# Patient Record
Sex: MALE | Race: WHITE | NOT HISPANIC OR LATINO | URBAN - METROPOLITAN AREA
[De-identification: names, ages, dates, MRNs, and addresses within clinical notes are randomized per-mention and may not be internally consistent; named-entity substitution may affect disease eponyms.]

---

## 2024-11-14 ENCOUNTER — APPOINTMENT (OUTPATIENT)
Dept: CT IMAGING | Facility: HOSPITAL | Age: 44
DRG: 372 | End: 2024-11-14
Payer: OTHER GOVERNMENT

## 2024-11-14 ENCOUNTER — HOSPITAL ENCOUNTER (INPATIENT)
Facility: HOSPITAL | Age: 44
LOS: 3 days | Discharge: HOME | DRG: 372 | End: 2024-11-17
Attending: EMERGENCY MEDICINE
Payer: OTHER GOVERNMENT

## 2024-11-14 DIAGNOSIS — K35.32 APPENDICITIS WITH PERFORATION: Primary | ICD-10-CM

## 2024-11-14 PROBLEM — K37 APPENDICITIS: Status: ACTIVE | Noted: 2024-11-14

## 2024-11-14 LAB
ALBUMIN SERPL-MCNC: 4.1 G/DL (ref 3.5–5.2)
ALBUMIN/GLOB SERPL: 1.1 G/DL
ALP SERPL-CCNC: 83 U/L (ref 39–117)
ALT SERPL W P-5'-P-CCNC: 72 U/L (ref 1–41)
ANION GAP SERPL CALCULATED.3IONS-SCNC: 13.7 MMOL/L (ref 5–15)
ANION GAP SERPL CALCULATED.3IONS-SCNC: 14.7 MMOL/L (ref 5–15)
AST SERPL-CCNC: 45 U/L (ref 1–40)
BACTERIA UR QL AUTO: NORMAL /HPF
BASOPHILS # BLD AUTO: 0.01 10*3/MM3 (ref 0–0.2)
BASOPHILS # BLD AUTO: 0.02 10*3/MM3 (ref 0–0.2)
BASOPHILS NFR BLD AUTO: 0.1 % (ref 0–1.5)
BASOPHILS NFR BLD AUTO: 0.2 % (ref 0–1.5)
BILIRUB SERPL-MCNC: 1.9 MG/DL (ref 0–1.2)
BILIRUB UR QL STRIP: ABNORMAL
BUN SERPL-MCNC: 18 MG/DL (ref 6–20)
BUN SERPL-MCNC: 19 MG/DL (ref 6–20)
BUN/CREAT SERPL: 17 (ref 7–25)
BUN/CREAT SERPL: 20 (ref 7–25)
CALCIUM SPEC-SCNC: 9 MG/DL (ref 8.6–10.5)
CALCIUM SPEC-SCNC: 9 MG/DL (ref 8.6–10.5)
CHLORIDE SERPL-SCNC: 89 MMOL/L (ref 98–107)
CHLORIDE SERPL-SCNC: 90 MMOL/L (ref 98–107)
CLARITY UR: ABNORMAL
CO2 SERPL-SCNC: 22.3 MMOL/L (ref 22–29)
CO2 SERPL-SCNC: 23.3 MMOL/L (ref 22–29)
COLOR UR: ABNORMAL
CREAT SERPL-MCNC: 0.9 MG/DL (ref 0.76–1.27)
CREAT SERPL-MCNC: 1.12 MG/DL (ref 0.76–1.27)
D-LACTATE SERPL-SCNC: 1.2 MMOL/L (ref 0.5–2)
DEPRECATED RDW RBC AUTO: 41.9 FL (ref 37–54)
DEPRECATED RDW RBC AUTO: 42.1 FL (ref 37–54)
EGFRCR SERPLBLD CKD-EPI 2021: 107.3 ML/MIN/1.73
EGFRCR SERPLBLD CKD-EPI 2021: 82.6 ML/MIN/1.73
EOSINOPHIL # BLD AUTO: 0 10*3/MM3 (ref 0–0.4)
EOSINOPHIL # BLD AUTO: 0.01 10*3/MM3 (ref 0–0.4)
EOSINOPHIL NFR BLD AUTO: 0 % (ref 0.3–6.2)
EOSINOPHIL NFR BLD AUTO: 0.1 % (ref 0.3–6.2)
ERYTHROCYTE [DISTWIDTH] IN BLOOD BY AUTOMATED COUNT: 12.1 % (ref 12.3–15.4)
ERYTHROCYTE [DISTWIDTH] IN BLOOD BY AUTOMATED COUNT: 12.1 % (ref 12.3–15.4)
GLOBULIN UR ELPH-MCNC: 3.8 GM/DL
GLUCOSE SERPL-MCNC: 127 MG/DL (ref 65–99)
GLUCOSE SERPL-MCNC: 166 MG/DL (ref 65–99)
GLUCOSE UR STRIP-MCNC: NEGATIVE MG/DL
HCT VFR BLD AUTO: 39.9 % (ref 37.5–51)
HCT VFR BLD AUTO: 43.2 % (ref 37.5–51)
HGB BLD-MCNC: 13.7 G/DL (ref 13–17.7)
HGB BLD-MCNC: 15.1 G/DL (ref 13–17.7)
HGB UR QL STRIP.AUTO: ABNORMAL
HYALINE CASTS UR QL AUTO: NORMAL /LPF
IMM GRANULOCYTES # BLD AUTO: 0.04 10*3/MM3 (ref 0–0.05)
IMM GRANULOCYTES # BLD AUTO: 0.06 10*3/MM3 (ref 0–0.05)
IMM GRANULOCYTES NFR BLD AUTO: 0.4 % (ref 0–0.5)
IMM GRANULOCYTES NFR BLD AUTO: 0.6 % (ref 0–0.5)
INR PPP: 1.21 (ref 0.9–1.1)
KETONES UR QL STRIP: NEGATIVE
LEUKOCYTE ESTERASE UR QL STRIP.AUTO: NEGATIVE
LIPASE SERPL-CCNC: 9 U/L (ref 13–60)
LYMPHOCYTES # BLD AUTO: 0.77 10*3/MM3 (ref 0.7–3.1)
LYMPHOCYTES # BLD AUTO: 0.81 10*3/MM3 (ref 0.7–3.1)
LYMPHOCYTES NFR BLD AUTO: 8.2 % (ref 19.6–45.3)
LYMPHOCYTES NFR BLD AUTO: 8.2 % (ref 19.6–45.3)
MCH RBC QN AUTO: 32.3 PG (ref 26.6–33)
MCH RBC QN AUTO: 32.6 PG (ref 26.6–33)
MCHC RBC AUTO-ENTMCNC: 34.3 G/DL (ref 31.5–35.7)
MCHC RBC AUTO-ENTMCNC: 35 G/DL (ref 31.5–35.7)
MCV RBC AUTO: 93.3 FL (ref 79–97)
MCV RBC AUTO: 94.1 FL (ref 79–97)
MONOCYTES # BLD AUTO: 0.7 10*3/MM3 (ref 0.1–0.9)
MONOCYTES # BLD AUTO: 0.83 10*3/MM3 (ref 0.1–0.9)
MONOCYTES NFR BLD AUTO: 7.5 % (ref 5–12)
MONOCYTES NFR BLD AUTO: 8.4 % (ref 5–12)
NEUTROPHILS NFR BLD AUTO: 7.8 10*3/MM3 (ref 1.7–7)
NEUTROPHILS NFR BLD AUTO: 8.21 10*3/MM3 (ref 1.7–7)
NEUTROPHILS NFR BLD AUTO: 82.8 % (ref 42.7–76)
NEUTROPHILS NFR BLD AUTO: 83.5 % (ref 42.7–76)
NITRITE UR QL STRIP: NEGATIVE
NRBC BLD AUTO-RTO: 0 /100 WBC (ref 0–0.2)
PH UR STRIP.AUTO: 6 [PH] (ref 5–8)
PLATELET # BLD AUTO: 214 10*3/MM3 (ref 140–450)
PLATELET # BLD AUTO: 221 10*3/MM3 (ref 140–450)
PMV BLD AUTO: 10.4 FL (ref 6–12)
PMV BLD AUTO: 10.7 FL (ref 6–12)
POTASSIUM SERPL-SCNC: 3.3 MMOL/L (ref 3.5–5.2)
POTASSIUM SERPL-SCNC: 3.3 MMOL/L (ref 3.5–5.2)
PROT SERPL-MCNC: 7.9 G/DL (ref 6–8.5)
PROT UR QL STRIP: ABNORMAL
PROTHROMBIN TIME: 15.3 SECONDS (ref 11.7–14.2)
RBC # BLD AUTO: 4.24 10*6/MM3 (ref 4.14–5.8)
RBC # BLD AUTO: 4.63 10*6/MM3 (ref 4.14–5.8)
RBC # UR STRIP: NORMAL /HPF
REF LAB TEST METHOD: NORMAL
SODIUM SERPL-SCNC: 125 MMOL/L (ref 136–145)
SODIUM SERPL-SCNC: 128 MMOL/L (ref 136–145)
SP GR UR STRIP: <=1.005 (ref 1–1.03)
SQUAMOUS #/AREA URNS HPF: NORMAL /HPF
UROBILINOGEN UR QL STRIP: ABNORMAL
WBC # UR STRIP: NORMAL /HPF
WBC NRBC COR # BLD AUTO: 9.35 10*3/MM3 (ref 3.4–10.8)
WBC NRBC COR # BLD AUTO: 9.91 10*3/MM3 (ref 3.4–10.8)

## 2024-11-14 PROCEDURE — 87040 BLOOD CULTURE FOR BACTERIA: CPT

## 2024-11-14 PROCEDURE — 25010000002 FENTANYL CITRATE (PF) 50 MCG/ML SOLUTION: Performed by: EMERGENCY MEDICINE

## 2024-11-14 PROCEDURE — 99222 1ST HOSP IP/OBS MODERATE 55: CPT | Performed by: SURGERY

## 2024-11-14 PROCEDURE — 25010000002 ONDANSETRON PER 1 MG: Performed by: EMERGENCY MEDICINE

## 2024-11-14 PROCEDURE — 85610 PROTHROMBIN TIME: CPT | Performed by: SURGERY

## 2024-11-14 PROCEDURE — 85025 COMPLETE CBC W/AUTO DIFF WBC: CPT | Performed by: EMERGENCY MEDICINE

## 2024-11-14 PROCEDURE — 80053 COMPREHEN METABOLIC PANEL: CPT | Performed by: EMERGENCY MEDICINE

## 2024-11-14 PROCEDURE — 83605 ASSAY OF LACTIC ACID: CPT | Performed by: EMERGENCY MEDICINE

## 2024-11-14 PROCEDURE — 25510000001 IOPAMIDOL PER 1 ML: Performed by: EMERGENCY MEDICINE

## 2024-11-14 PROCEDURE — 25010000002 MORPHINE PER 10 MG: Performed by: INTERNAL MEDICINE

## 2024-11-14 PROCEDURE — 25010000002 METRONIDAZOLE 500 MG/100ML SOLUTION

## 2024-11-14 PROCEDURE — 99285 EMERGENCY DEPT VISIT HI MDM: CPT

## 2024-11-14 PROCEDURE — 83690 ASSAY OF LIPASE: CPT | Performed by: EMERGENCY MEDICINE

## 2024-11-14 PROCEDURE — 25010000002 PIPERACILLIN SOD-TAZOBACTAM PER 1 G: Performed by: EMERGENCY MEDICINE

## 2024-11-14 PROCEDURE — 25010000002 KETOROLAC TROMETHAMINE PER 15 MG: Performed by: EMERGENCY MEDICINE

## 2024-11-14 PROCEDURE — 74177 CT ABD & PELVIS W/CONTRAST: CPT

## 2024-11-14 PROCEDURE — 85025 COMPLETE CBC W/AUTO DIFF WBC: CPT

## 2024-11-14 PROCEDURE — 25810000003 LACTATED RINGERS PER 1000 ML: Performed by: EMERGENCY MEDICINE

## 2024-11-14 PROCEDURE — 99285 EMERGENCY DEPT VISIT HI MDM: CPT | Performed by: EMERGENCY MEDICINE

## 2024-11-14 PROCEDURE — 25010000002 CEFTRIAXONE PER 250 MG

## 2024-11-14 PROCEDURE — 81001 URINALYSIS AUTO W/SCOPE: CPT | Performed by: EMERGENCY MEDICINE

## 2024-11-14 PROCEDURE — 25810000003 SODIUM CHLORIDE 0.9 % SOLUTION

## 2024-11-14 RX ORDER — BISACODYL 5 MG/1
5 TABLET, DELAYED RELEASE ORAL DAILY PRN
Status: DISCONTINUED | OUTPATIENT
Start: 2024-11-14 | End: 2024-11-17 | Stop reason: HOSPADM

## 2024-11-14 RX ORDER — ONDANSETRON 4 MG/1
4 TABLET, ORALLY DISINTEGRATING ORAL EVERY 6 HOURS PRN
Status: DISCONTINUED | OUTPATIENT
Start: 2024-11-14 | End: 2024-11-17 | Stop reason: HOSPADM

## 2024-11-14 RX ORDER — METRONIDAZOLE 500 MG/100ML
500 INJECTION, SOLUTION INTRAVENOUS EVERY 8 HOURS
Status: DISCONTINUED | OUTPATIENT
Start: 2024-11-14 | End: 2024-11-17 | Stop reason: HOSPADM

## 2024-11-14 RX ORDER — ONDANSETRON 2 MG/ML
4 INJECTION INTRAMUSCULAR; INTRAVENOUS ONCE
Status: COMPLETED | OUTPATIENT
Start: 2024-11-14 | End: 2024-11-14

## 2024-11-14 RX ORDER — SODIUM CHLORIDE, SODIUM LACTATE, POTASSIUM CHLORIDE, CALCIUM CHLORIDE 600; 310; 30; 20 MG/100ML; MG/100ML; MG/100ML; MG/100ML
1000 INJECTION, SOLUTION INTRAVENOUS CONTINUOUS
Status: DISPENSED | OUTPATIENT
Start: 2024-11-14 | End: 2024-11-14

## 2024-11-14 RX ORDER — IOPAMIDOL 755 MG/ML
100 INJECTION, SOLUTION INTRAVASCULAR
Status: COMPLETED | OUTPATIENT
Start: 2024-11-14 | End: 2024-11-14

## 2024-11-14 RX ORDER — IBUPROFEN 200 MG
600 TABLET ORAL EVERY 6 HOURS PRN
COMMUNITY
End: 2024-11-17 | Stop reason: HOSPADM

## 2024-11-14 RX ORDER — NITROGLYCERIN 0.4 MG/1
0.4 TABLET SUBLINGUAL
Status: DISCONTINUED | OUTPATIENT
Start: 2024-11-14 | End: 2024-11-17 | Stop reason: HOSPADM

## 2024-11-14 RX ORDER — KETOROLAC TROMETHAMINE 30 MG/ML
30 INJECTION, SOLUTION INTRAMUSCULAR; INTRAVENOUS ONCE
Status: COMPLETED | OUTPATIENT
Start: 2024-11-14 | End: 2024-11-14

## 2024-11-14 RX ORDER — SODIUM CHLORIDE 9 MG/ML
100 INJECTION, SOLUTION INTRAVENOUS CONTINUOUS
Status: DISPENSED | OUTPATIENT
Start: 2024-11-14 | End: 2024-11-15

## 2024-11-14 RX ORDER — POTASSIUM CHLORIDE 1500 MG/1
40 TABLET, EXTENDED RELEASE ORAL ONCE
Status: COMPLETED | OUTPATIENT
Start: 2024-11-14 | End: 2024-11-14

## 2024-11-14 RX ORDER — ONDANSETRON 2 MG/ML
4 INJECTION INTRAMUSCULAR; INTRAVENOUS EVERY 6 HOURS PRN
Status: DISCONTINUED | OUTPATIENT
Start: 2024-11-14 | End: 2024-11-17 | Stop reason: HOSPADM

## 2024-11-14 RX ORDER — FENTANYL CITRATE 50 UG/ML
50 INJECTION, SOLUTION INTRAMUSCULAR; INTRAVENOUS ONCE
Status: COMPLETED | OUTPATIENT
Start: 2024-11-14 | End: 2024-11-14

## 2024-11-14 RX ORDER — AMOXICILLIN 250 MG
2 CAPSULE ORAL 2 TIMES DAILY PRN
Status: DISCONTINUED | OUTPATIENT
Start: 2024-11-14 | End: 2024-11-17 | Stop reason: HOSPADM

## 2024-11-14 RX ORDER — BISACODYL 10 MG
10 SUPPOSITORY, RECTAL RECTAL DAILY PRN
Status: DISCONTINUED | OUTPATIENT
Start: 2024-11-14 | End: 2024-11-17 | Stop reason: HOSPADM

## 2024-11-14 RX ORDER — POLYETHYLENE GLYCOL 3350 17 G/17G
17 POWDER, FOR SOLUTION ORAL DAILY PRN
Status: DISCONTINUED | OUTPATIENT
Start: 2024-11-14 | End: 2024-11-17 | Stop reason: HOSPADM

## 2024-11-14 RX ORDER — HYDROCODONE BITARTRATE AND ACETAMINOPHEN 5; 325 MG/1; MG/1
1 TABLET ORAL EVERY 6 HOURS PRN
Status: DISCONTINUED | OUTPATIENT
Start: 2024-11-14 | End: 2024-11-17 | Stop reason: HOSPADM

## 2024-11-14 RX ADMIN — FENTANYL CITRATE 50 MCG: 50 INJECTION, SOLUTION INTRAMUSCULAR; INTRAVENOUS at 09:54

## 2024-11-14 RX ADMIN — METRONIDAZOLE 500 MG: 500 INJECTION, SOLUTION INTRAVENOUS at 23:45

## 2024-11-14 RX ADMIN — HYDROCODONE BITARTRATE AND ACETAMINOPHEN 1 TABLET: 5; 325 TABLET ORAL at 22:41

## 2024-11-14 RX ADMIN — ONDANSETRON 4 MG: 2 INJECTION, SOLUTION INTRAMUSCULAR; INTRAVENOUS at 09:51

## 2024-11-14 RX ADMIN — Medication 5 MG: at 20:53

## 2024-11-14 RX ADMIN — MORPHINE SULFATE 4 MG: 4 INJECTION, SOLUTION INTRAMUSCULAR; INTRAVENOUS at 15:49

## 2024-11-14 RX ADMIN — METRONIDAZOLE 500 MG: 500 INJECTION, SOLUTION INTRAVENOUS at 18:23

## 2024-11-14 RX ADMIN — SODIUM CHLORIDE 100 ML/HR: 9 INJECTION, SOLUTION INTRAVENOUS at 19:48

## 2024-11-14 RX ADMIN — SODIUM CHLORIDE, POTASSIUM CHLORIDE, SODIUM LACTATE AND CALCIUM CHLORIDE 1000 ML/HR: 600; 310; 30; 20 INJECTION, SOLUTION INTRAVENOUS at 11:39

## 2024-11-14 RX ADMIN — POTASSIUM CHLORIDE 40 MEQ: 1500 TABLET, EXTENDED RELEASE ORAL at 18:23

## 2024-11-14 RX ADMIN — KETOROLAC TROMETHAMINE 30 MG: 30 INJECTION, SOLUTION INTRAMUSCULAR at 09:53

## 2024-11-14 RX ADMIN — CEFTRIAXONE 2000 MG: 2 INJECTION, POWDER, FOR SOLUTION INTRAMUSCULAR; INTRAVENOUS at 19:10

## 2024-11-14 RX ADMIN — PIPERACILLIN AND TAZOBACTAM 4.5 G: 4; .5 INJECTION, POWDER, FOR SOLUTION INTRAVENOUS at 11:04

## 2024-11-14 RX ADMIN — IOPAMIDOL 100 ML: 755 INJECTION, SOLUTION INTRAVENOUS at 10:10

## 2024-11-14 NOTE — H&P
"[facility] Medicine Services  History & Physical    Patient Name: Alem Arroyo  : 1980  MRN: [id number]  Primary Care Physician:  Provider, No Known  Date of admission: 2024  Date and Time of Service: 2024 at 1520    Subjective      Chief Complaint: abdominal pain    History of Present Illness: Alem Arroyo is a 44 y.o. male with a CMH of ADHD who presented to [facility] on 2024 as a transfer from [facility] after arriving with c/o abdominal pain since this past weekend. Patient attributed the pain to kidney stones, but once pain persisted until this morning he sought medical treatment. Patient complained of RLQ abdominal pain that worsened with movement. Patient reports he had occasional chills and diaphoresis but never formally checked a temperature at home so fever unknown.  Patient denies SOB, CP, dizziness, headache at this time.  Patient denies any known sick contacts.    On ED evaluation, patient tachycardic on arrival. Labs remarkable for sodium 125, potassium 3.3, chloride 89, elevated ALT/AST at 72/45, total bilirubin 1.9.  Initial lactic 1.2.  CT abdomen pelvis with contrast was performed showing \"Findings compatible with acute appendicitis with appendiceal perforation and subsequent formation of lobular fluid collections in the central right lower pelvis likely all contiguous compatible with abdominal abscess. There are reactive inflammatory   changes involving the sigmoid colon, distal ileum and urinary bladder with some free intraperitoneal gas noted\".  The ED provider spoke with Dr. Martinez with general surgery who agreed to consult on patient.  Case was discussed with Dr. Martinez who desired to do conservative management with IV antibiotics and possible percutaneous drain over surgical intervention at this time.  Patient was started on IV antibiotics and given IV pain medication and antiemetic in the ED. Hospitalist service to admit for " further management.      Review of Systems   Respiratory:  Negative for shortness of breath.    Cardiovascular:  Negative for chest pain.   Gastrointestinal:  Positive for abdominal pain and nausea. Negative for diarrhea.   Neurological:  Negative for dizziness and headaches.       Personal History     No past medical history on file.    No past surgical history on file.    Family History: family history is not on file. Otherwise pertinent FHx was reviewed and not pertinent to current issue.    Social History:      Home Medications:  Prior to Admission Medications       None              Allergies:  No Known Allergies    Objective      Vitals:   Temp:  [98.5 °F (36.9 °C)] 98.5 °F (36.9 °C)  Heart Rate:  [134] 134  Resp:  [16] 16  BP: (143)/(93) 143/93  Body mass index is 33.02 kg/m².  Physical Exam  General: 43 yo male, Alert and oriented, obese, no acute distress.  HENT: Normocephalic, normal hearing, moist oral mucosa, no scleral icterus.  Neck: Supple, non-tender, no carotid bruits, no JVD, no LAD.  Lungs: Clear to auscultation, non-labored respiration.  Heart: RRR, no murmur, gallop or edema.  Abdomen: Soft, tender to palpation in RLQ, non-distended, + bowel sounds.  Musculoskeletal: Normal range of motion and strength, no tenderness or swelling.  Skin: Skin is warm, dry and pink, no rashes or lesions.  Psychiatric: Cooperative, appropriate mood and affect.      Diagnostic Data:  Lab Results (last 24 hours)       Procedure Component Value Units Date/Time    Lactic Acid, Plasma [[id number]]  (Normal) Collected: 11/14/24 1108    Specimen: Blood Updated: 11/14/24 1127     Lactate 1.2 mmol/L     Comprehensive Metabolic Panel [[id number]]  (Abnormal) Collected: 11/14/24 0945    Specimen: Blood Updated: 11/14/24 1009     Glucose 166 mg/dL      BUN 19 mg/dL      Creatinine 1.12 mg/dL      Sodium 125 mmol/L      Potassium 3.3 mmol/L      Chloride 89 mmol/L      CO2 22.3 mmol/L      Calcium 9.0 mg/dL      Total Protein 7.9  g/dL      Albumin 4.1 g/dL      ALT (SGPT) 72 U/L      AST (SGOT) 45 U/L      Alkaline Phosphatase 83 U/L      Total Bilirubin 1.9 mg/dL      Globulin 3.8 gm/dL      A/G Ratio 1.1 g/dL      BUN/Creatinine Ratio 17.0     Anion Gap 13.7 mmol/L      eGFR 82.6 mL/min/1.73     Narrative:      GFR Normal >60  Chronic Kidney Disease <60  Kidney Failure <15      Lipase [[id number]]  (Abnormal) Collected: 11/14/24 0945    Specimen: Blood Updated: 11/14/24 1009     Lipase 9 U/L     CBC & Differential [[id number]]  (Abnormal) Collected: 11/14/24 0945    Specimen: Blood Updated: 11/14/24 0950    Narrative:      The following orders were created for panel order CBC & Differential.  Procedure                               Abnormality         Status                     ---------                               -----------         ------                     CBC Auto Differential[[id number]]        Abnormal            Final result                 Please view results for these tests on the individual orders.    CBC Auto Differential [[id number]]  (Abnormal) Collected: 11/14/24 0945    Specimen: Blood Updated: 11/14/24 0950     WBC 9.91 10*3/mm3      RBC 4.63 10*6/mm3      Hemoglobin 15.1 g/dL      Hematocrit 43.2 %      MCV 93.3 fL      MCH 32.6 pg      MCHC 35.0 g/dL      RDW 12.1 %      RDW-SD 41.9 fl      MPV 10.4 fL      Platelets 214 10*3/mm3      Neutrophil % 82.8 %      Lymphocyte % 8.2 %      Monocyte % 8.4 %      Eosinophil % 0.0 %      Basophil % 0.2 %      Immature Grans % 0.4 %      Neutrophils, Absolute 8.21 10*3/mm3      Lymphocytes, Absolute 0.81 10*3/mm3      Monocytes, Absolute 0.83 10*3/mm3      Eosinophils, Absolute 0.00 10*3/mm3      Basophils, Absolute 0.02 10*3/mm3      Immature Grans, Absolute 0.04 10*3/mm3              Imaging Results (Last 24 Hours)       Procedure Component Value Units Date/Time    CT Abdomen Pelvis With Contrast [[id number]] Collected: 11/14/24 1012     Updated: 11/14/24 1026    Narrative:      CT  ABDOMEN PELVIS W CONTRAST    Date of Exam: 11/14/2024 10:00 AM EST    Indication: rlq pain.    Comparison: None available.    Technique: Axial CT images were obtained of the abdomen and pelvis following the uneventful intravenous administration of iodinated contrast. Sagittal and coronal reconstructions were performed.  Automated exposure control and iterative reconstruction   methods were used.        Findings:  LUNG BASES:  Unremarkable without mass or infiltrate.    LIVER:  Unremarkable parenchyma without focal lesion.  BILIARY/GALLBLADDER:  Unremarkable  SPLEEN:  Unremarkable  PANCREAS:  Unremarkable  ADRENAL:  Unremarkable  KIDNEYS:  Unremarkable parenchyma with no solid mass identified. No obstruction.  No calculus identified.  GASTROINTESTINAL/MESENTERY: Evaluation of the gastrointestinal tract demonstrates multiple fluid collections in the central pelvis and right lower quadrant which are likely all contiguous demonstrating peripheral enhancement and a gas/fluid level. The   fluid collections measure 4.4 x 2.9 cm (image 137 of series 2), 5.3 x 3.8 cm (image 151 of series 2 and 3.6 x 2.2 cm (image 148 of series 2). The appendix has mucosal enhancement and is prominent in size measuring up to 10 mm in size. There is soft   tissue stranding and fluid in the right lower quadrant. Appendicitis with perforation is suspected. There is bowel wall thickening involving the sigmoid colon and distal ileum likely secondary reactive inflammatory changes. Mesenteric stranding and fluid   tracks centrally into the lower pelvis. Focal free intraperitoneal gas is noted in the left lower pelvis (image 142 of series 2).  MESENTERIC VESSELS:  Patent.  AORTA/IVC:  Normal caliber.    RETROPERITONEUM/LYMPH NODES: There is a lymph node anterior to the right psoas muscle along the right iliac vessels measuring 1 cm.    REPRODUCTIVE:  Unremarkable  BLADDER: The urinary bladder demonstrates mild wall thickening and inflammatory fat  stranding likely reactive inflammatory changes as well.    OSSEUS STRUCTURES:  Typical for age with no acute process identified.        Impression:      Impression:    1. Findings compatible with acute appendicitis with appendiceal perforation and subsequent formation of lobular fluid collections in the central right lower pelvis likely all contiguous compatible with abdominal abscess. There are reactive inflammatory   changes involving the sigmoid colon, distal ileum and urinary bladder with some free intraperitoneal gas noted.    2. Findings were called and discussed with Dr. Arroyo by myself at 10:23 a.m.            Electronically Signed: Linda Paul MD    11/14/2024 10:24 AM EST    Workstation ID: [id number]              Assessment & Plan        This is a 44 y.o. male with:    Active and Resolved Problems  There are no hospital problems to display for this patient.      Acute appendicitis with perforation  CT imaging consistent with acute appendicitis with appendiceal perforation  General surgery notified and agreed to consult; Case discussed with Dr. Martinez, plan for conservative management at this time (IV antibiotics and drain placement)  Continue IV antibiotics -changed to Rocephin and Flagyl  Analgesics as needed  Antiemetics as needed  Lactic 1.2  WBC normal at 9.91  A.m. labs ordered  Per Dr. Martinez, IR consulted for drain placement  Clear liquid diet as tolerated today, n.p.o. at midnight    Hyponatremia  Hypokalemia  Replete and recheck  Maintenance IV fluids overnight  A.m. labs ordered to monitor  Slow correction of sodium    ADHD  Patient is not on any medications at home, just recently got off of Wellbutrin    VTE Prophylaxis:  Mechanical VTE prophylaxis orders are signed & held.          The patient desires to be as follows:    CODE STATUS:    Code Status (Patient has no pulse and is not breathing): CPR (Attempt to Resuscitate)  Medical Interventions (Patient has pulse or is breathing):  Full Support        Joy Cruz, who can be contacted at [phone], is the designated person to make medical decisions on the patient's behalf if He is incapable of doing so. This was clarified with patient and/or next of kin on 11/14/2024 during the course of this H&P.    Admission Status:  I believe this patient meets inpatient status.    Expected Length of Stay: >2 midnights    PDMP and Medication Dispenses via Sidebar reviewed and consistent with patient reported medications.    I discussed the patient's findings and my recommendations with patient.      Signature:     This document has been electronically signed by SAIRA Adler on November 14, 2024 13:00 [facility] Hospitalist Team

## 2024-11-14 NOTE — FSED PROVIDER NOTE
Subjective   History of Present Illness  Pt presents with several days of worsening lower abdominal pain that has not improved, he describes dark urine without dysuria, nausea without v/d, drew minimal po, no cp/soa/ha, no cough/uri/f, pain radiates to his groin, no testicle pain, no relieving factors    History provided by:  Patient   used: No        Review of Systems   Respiratory:  Negative for apnea.    Cardiovascular:  Negative for chest pain.   Gastrointestinal:  Positive for abdominal pain.   All other systems reviewed and are negative.      No past medical history on file.    No Known Allergies    No past surgical history on file.    No family history on file.    Social History     Socioeconomic History    Marital status:            Objective   Physical Exam  Vitals and nursing note reviewed.   HENT:      Head: Normocephalic.      Mouth/Throat:      Mouth: Mucous membranes are moist.   Eyes:      Extraocular Movements: Extraocular movements intact.   Cardiovascular:      Rate and Rhythm: Normal rate.   Pulmonary:      Effort: Pulmonary effort is normal.   Abdominal:      Palpations: Abdomen is soft.      Tenderness: There is abdominal tenderness in the right lower quadrant and periumbilical area. There is no guarding.   Skin:     General: Skin is warm.      Capillary Refill: Capillary refill takes less than 2 seconds.   Neurological:      General: No focal deficit present.      Mental Status: He is alert.   Psychiatric:         Mood and Affect: Mood normal.         Procedures           ED Course                                           Medical Decision Making  Ct abd pelvis shows walled off appendiceal perf with abscess, iv zosyn started  Pt given LR, zofran and fentanyl for pain control  Cbc without wbc elevation  Cmp within limits  -lipase  D/w Martinez who request hosp admit, d/w NP who accepts  Pt aware, NPO and feeling ok    Amount and/or Complexity of Data Reviewed  Labs:  ordered. Decision-making details documented in ED Course.  Radiology: ordered. Decision-making details documented in ED Course.    Risk  Prescription drug management.        Final diagnoses:   Appendicitis with perforation       ED Disposition  ED Disposition       ED Disposition   Decision to Admit    Condition   --    Comment   Level of Care: Med/Surg [1]   Admitting Physician: JACKIE ORLANDO [[id number]]   Attending Physician: JACKIE ORLANDO [[id number]]   Patient Class: Observation [104]                 No follow-up provider specified.       Medication List      No changes were made to your prescriptions during this visit.

## 2024-11-14 NOTE — PLAN OF CARE
Goal Outcome Evaluation:   Patient admitted to unit from [facility]. Patient given pain medication, see MAR.

## 2024-11-14 NOTE — CONSULTS
GENERAL SURGERY CONSULTATION NOTE    Consult requested by: [facility]    Patient Care Team:  Provider, No Known as PCP - General    Reason for consult: Acute perforated appendicitis with abscess    Subjective     Patient is a 44 y.o. male presents with onset of abdominal pain which started approximately 5 days ago.  The patient reports that he started having some vague abdominal discomfort which persisted until 2 days ago at which point he could not go to work.  He laid in bed and began to feel better before yesterday at which point he reports that he had significant discomfort in his lower abdomen which prompted him to come in for evaluation.  He has never had any pain like this before.  He nor anyone in his family have any history of inflammatory bowel disease.  He has not had a prior colonoscopy.  In the outside ER, the patient was found to have a normal white blood cell count.  CT scan of the abdomen and pelvis demonstrated acute perforated appendicitis with abscess.  The patient was then subsequently transferred to [facility] for general surgery evaluation and surgical management.     Review of Systems   Constitutional:  Positive for appetite change and fatigue.   Gastrointestinal:  Positive for abdominal distention, abdominal pain, constipation and nausea.        History  History reviewed. No pertinent past medical history.  History reviewed. No pertinent surgical history.  History reviewed. No pertinent family history.  Social History     Tobacco Use    Smoking status: Former     Types: Cigarettes     Start date: 01/2023    Smokeless tobacco: Never   Vaping Use    Vaping status: Never Used   Substance Use Topics    Drug use: Never     Medications Prior to Admission   Medication Sig Dispense Refill Last Dose/Taking    ibuprofen (ADVIL,MOTRIN) 200 MG tablet Take 3 tablets by mouth Every 6 (Six) Hours As Needed for Mild Pain.   11/14/2024 Morning     Allergies:  Patient has no  "known allergies.    Objective     Vital Signs  /88 (BP Location: Left arm, Patient Position: Lying)   Pulse 93   Temp 99.5 °F (37.5 °C) (Oral)   Resp 18   Ht 200.7 cm (79\")   Wt 133 kg (293 lb)   SpO2 97%   BMI 33.01 kg/m²      Physical Exam  Vitals reviewed.   Constitutional:       Appearance: He is well-developed. He is obese.   HENT:      Head: Normocephalic and atraumatic.   Eyes:      Pupils: Pupils are equal, round, and reactive to light.   Cardiovascular:      Rate and Rhythm: Normal rate and regular rhythm.   Pulmonary:      Effort: Pulmonary effort is normal.      Breath sounds: Normal breath sounds.   Abdominal:      General: There is no distension.      Palpations: Abdomen is soft.      Tenderness: There is abdominal tenderness in the right lower quadrant, periumbilical area and suprapubic area. There is guarding. There is no rebound. Positive signs include McBurney's sign. Negative signs include Rovsing's sign.      Hernia: No hernia is present.   Musculoskeletal:         General: Normal range of motion.      Cervical back: Normal range of motion.   Lymphadenopathy:      Cervical: No cervical adenopathy.   Skin:     General: Skin is warm and dry.      Findings: No rash.   Neurological:      Mental Status: He is alert and oriented to person, place, and time.         Results Review:   Lab Results (last 24 hours)       Procedure Component Value Units Date/Time    Urinalysis With Microscopic If Indicated (No Culture) - Urine, Clean Catch [[id number]]  (Abnormal) Collected: 11/14/24 1331    Specimen: Urine, Clean Catch Updated: 11/14/24 1334     Color, UA Dark Yellow     Appearance, UA Cloudy     pH, UA 6.0     Specific Gravity, UA <=1.005     Glucose, UA Negative     Ketones, UA Negative     Bilirubin, UA Small (1+)     Blood, UA Trace     Protein,  mg/dL (2+)     Leuk Esterase, UA Negative     Nitrite, UA Negative     Urobilinogen, UA 0.2 E.U./dL    [place] Urine Culture Tube - Urine, " Clean Catch [[id number]] Collected: 11/14/24 1331    Specimen: Urine, Clean Catch Updated: 11/14/24 1334    Urinalysis, Microscopic Only - Urine, Clean Catch [[id number]] Collected: 11/14/24 1331    Specimen: Urine, Clean Catch Updated: 11/14/24 1334    Lactic Acid, Plasma [[id number]]  (Normal) Collected: 11/14/24 1108    Specimen: Blood Updated: 11/14/24 1127     Lactate 1.2 mmol/L     Comprehensive Metabolic Panel [[id number]]  (Abnormal) Collected: 11/14/24 0945    Specimen: Blood Updated: 11/14/24 1009     Glucose 166 mg/dL      BUN 19 mg/dL      Creatinine 1.12 mg/dL      Sodium 125 mmol/L      Potassium 3.3 mmol/L      Chloride 89 mmol/L      CO2 22.3 mmol/L      Calcium 9.0 mg/dL      Total Protein 7.9 g/dL      Albumin 4.1 g/dL      ALT (SGPT) 72 U/L      AST (SGOT) 45 U/L      Alkaline Phosphatase 83 U/L      Total Bilirubin 1.9 mg/dL      Globulin 3.8 gm/dL      A/G Ratio 1.1 g/dL      BUN/Creatinine Ratio 17.0     Anion Gap 13.7 mmol/L      eGFR 82.6 mL/min/1.73     Narrative:      GFR Normal >60  Chronic Kidney Disease <60  Kidney Failure <15      Lipase [[id number]]  (Abnormal) Collected: 11/14/24 0945    Specimen: Blood Updated: 11/14/24 1009     Lipase 9 U/L     CBC & Differential [[id number]]  (Abnormal) Collected: 11/14/24 0945    Specimen: Blood Updated: 11/14/24 0950    Narrative:      The following orders were created for panel order CBC & Differential.  Procedure                               Abnormality         Status                     ---------                               -----------         ------                     CBC Auto Differential[[id number]]        Abnormal            Final result                 Please view results for these tests on the individual orders.    CBC Auto Differential [[id number]]  (Abnormal) Collected: 11/14/24 0945    Specimen: Blood Updated: 11/14/24 0950     WBC 9.91 10*3/mm3      RBC 4.63 10*6/mm3      Hemoglobin 15.1 g/dL      Hematocrit 43.2 %      MCV 93.3 fL       MCH 32.6 pg      MCHC 35.0 g/dL      RDW 12.1 %      RDW-SD 41.9 fl      MPV 10.4 fL      Platelets 214 10*3/mm3      Neutrophil % 82.8 %      Lymphocyte % 8.2 %      Monocyte % 8.4 %      Eosinophil % 0.0 %      Basophil % 0.2 %      Immature Grans % 0.4 %      Neutrophils, Absolute 8.21 10*3/mm3      Lymphocytes, Absolute 0.81 10*3/mm3      Monocytes, Absolute 0.83 10*3/mm3      Eosinophils, Absolute 0.00 10*3/mm3      Basophils, Absolute 0.02 10*3/mm3      Immature Grans, Absolute 0.04 10*3/mm3           CT Abdomen Pelvis With Contrast    Result Date: 11/14/2024  Impression: 1. Findings compatible with acute appendicitis with appendiceal perforation and subsequent formation of lobular fluid collections in the central right lower pelvis likely all contiguous compatible with abdominal abscess. There are reactive inflammatory changes involving the sigmoid colon, distal ileum and urinary bladder with some free intraperitoneal gas noted. 2. Findings were called and discussed with Dr. Arroyo by myself at 10:23 a.m. Electronically Signed: Linda Paul MD  11/14/2024 10:24 AM EST  Workstation ID: [id number]       I reviewed the patient's new imaging results and agree with the interpretation.  I reviewed the patient's other test results and agree with the interpretation    Assessment & Plan     Principal Problem:    Appendicitis    Laboratory values and CT scan images reviewed.  Patient appears to have acute appendicitis with appendiceal perforation and abscess.  Recommend trial of nonoperative management with IR drain placement and IV antibiotics.  Discussed with patient goal would be to treat this initially with nonoperative management and then follow-up with interval appendectomy in about 2 months.  Risk, benefits, and alternatives to this were discussed with the patient understands, and agrees to continue nonoperative management.  Discussed with IR will plan for this tomorrow  Okay for clear liquid diet tonight,  n.p.o. after midnight  Discussed with APRN from hospitalist service antibiotic selection which would be cefepime and Flagyl.    I discussed the patients findings and my recommendations with the patient.     Singh Martinez MD  11/14/24  16:01 EST

## 2024-11-14 NOTE — ED NOTES
Pt presents with c/o RLQ abdominal pain, reports radiating to middle of the abdomen x 5 days. Pt reports increased difficulty urinating, states last urinated this morning. Reports concern for kidney stones. Reports recently stopped taking prescribed wellbutrin.

## 2024-11-14 NOTE — ED NOTES
Report given to [facility]. Pt transferred to [facility]. Bed status: Ready.

## 2024-11-14 NOTE — Clinical Note
Level of Care: Med/Surg [1]   Diagnosis: Appendicitis [957616]   Admitting Physician: JACKIE ORLANDO [[id number]]   Attending Physician: JACKIE ORLANDO [[id number]]   Certification: I Certify That Inpatient Hospital Services Are Medically Necessary For Greater Than 2 Midnights

## 2024-11-15 ENCOUNTER — APPOINTMENT (OUTPATIENT)
Dept: CT IMAGING | Facility: HOSPITAL | Age: 44
DRG: 372 | End: 2024-11-15
Payer: OTHER GOVERNMENT

## 2024-11-15 PROBLEM — K35.32 APPENDICITIS WITH PERFORATION: Status: ACTIVE | Noted: 2024-11-15

## 2024-11-15 LAB — POTASSIUM SERPL-SCNC: 4 MMOL/L (ref 3.5–5.2)

## 2024-11-15 PROCEDURE — 25010000002 MIDAZOLAM PER 1 MG: Performed by: RADIOLOGY

## 2024-11-15 PROCEDURE — 25010000002 CEFTRIAXONE PER 250 MG

## 2024-11-15 PROCEDURE — 87070 CULTURE OTHR SPECIMN AEROBIC: CPT | Performed by: RADIOLOGY

## 2024-11-15 PROCEDURE — 25010000002 LIDOCAINE 1 % SOLUTION: Performed by: RADIOLOGY

## 2024-11-15 PROCEDURE — 25810000003 SODIUM CHLORIDE 0.9 % SOLUTION

## 2024-11-15 PROCEDURE — C1769 GUIDE WIRE: HCPCS

## 2024-11-15 PROCEDURE — 0W9G30Z DRAINAGE OF PERITONEAL CAVITY WITH DRAINAGE DEVICE, PERCUTANEOUS APPROACH: ICD-10-PCS | Performed by: RADIOLOGY

## 2024-11-15 PROCEDURE — 49406 IMAGE CATH FLUID PERI/RETRO: CPT

## 2024-11-15 PROCEDURE — 87205 SMEAR GRAM STAIN: CPT | Performed by: RADIOLOGY

## 2024-11-15 PROCEDURE — 99152 MOD SED SAME PHYS/QHP 5/>YRS: CPT

## 2024-11-15 PROCEDURE — C1729 CATH, DRAINAGE: HCPCS

## 2024-11-15 PROCEDURE — 84132 ASSAY OF SERUM POTASSIUM: CPT | Performed by: INTERNAL MEDICINE

## 2024-11-15 PROCEDURE — 25010000002 METRONIDAZOLE 500 MG/100ML SOLUTION

## 2024-11-15 PROCEDURE — 87186 SC STD MICRODIL/AGAR DIL: CPT | Performed by: RADIOLOGY

## 2024-11-15 PROCEDURE — 25010000002 FENTANYL CITRATE (PF) 50 MCG/ML SOLUTION: Performed by: RADIOLOGY

## 2024-11-15 PROCEDURE — 87015 SPECIMEN INFECT AGNT CONCNTJ: CPT | Performed by: RADIOLOGY

## 2024-11-15 RX ORDER — LIDOCAINE HYDROCHLORIDE 10 MG/ML
INJECTION, SOLUTION INFILTRATION; PERINEURAL AS NEEDED
Status: COMPLETED | OUTPATIENT
Start: 2024-11-15 | End: 2024-11-15

## 2024-11-15 RX ORDER — MIDAZOLAM HYDROCHLORIDE 1 MG/ML
INJECTION, SOLUTION INTRAMUSCULAR; INTRAVENOUS AS NEEDED
Status: COMPLETED | OUTPATIENT
Start: 2024-11-15 | End: 2024-11-15

## 2024-11-15 RX ORDER — FENTANYL CITRATE 50 UG/ML
INJECTION, SOLUTION INTRAMUSCULAR; INTRAVENOUS AS NEEDED
Status: COMPLETED | OUTPATIENT
Start: 2024-11-15 | End: 2024-11-15

## 2024-11-15 RX ORDER — POTASSIUM CHLORIDE 1500 MG/1
40 TABLET, EXTENDED RELEASE ORAL EVERY 4 HOURS
Status: COMPLETED | OUTPATIENT
Start: 2024-11-15 | End: 2024-11-15

## 2024-11-15 RX ADMIN — FENTANYL CITRATE 100 MCG: 50 INJECTION, SOLUTION INTRAMUSCULAR; INTRAVENOUS at 08:34

## 2024-11-15 RX ADMIN — MIDAZOLAM 1 MG: 1 INJECTION INTRAMUSCULAR; INTRAVENOUS at 08:38

## 2024-11-15 RX ADMIN — CEFTRIAXONE 2000 MG: 2 INJECTION, POWDER, FOR SOLUTION INTRAMUSCULAR; INTRAVENOUS at 16:36

## 2024-11-15 RX ADMIN — HYDROCODONE BITARTRATE AND ACETAMINOPHEN 1 TABLET: 5; 325 TABLET ORAL at 05:44

## 2024-11-15 RX ADMIN — SODIUM CHLORIDE 100 ML/HR: 9 INJECTION, SOLUTION INTRAVENOUS at 06:32

## 2024-11-15 RX ADMIN — METRONIDAZOLE 500 MG: 500 INJECTION, SOLUTION INTRAVENOUS at 07:50

## 2024-11-15 RX ADMIN — MIDAZOLAM 1 MG: 1 INJECTION INTRAMUSCULAR; INTRAVENOUS at 08:34

## 2024-11-15 RX ADMIN — METRONIDAZOLE 500 MG: 500 INJECTION, SOLUTION INTRAVENOUS at 15:44

## 2024-11-15 RX ADMIN — SODIUM CHLORIDE 100 ML/HR: 9 INJECTION, SOLUTION INTRAVENOUS at 19:11

## 2024-11-15 RX ADMIN — Medication 5 MG: at 20:15

## 2024-11-15 RX ADMIN — POTASSIUM CHLORIDE 40 MEQ: 1500 TABLET, EXTENDED RELEASE ORAL at 05:41

## 2024-11-15 RX ADMIN — POTASSIUM CHLORIDE 40 MEQ: 1500 TABLET, EXTENDED RELEASE ORAL at 02:01

## 2024-11-15 RX ADMIN — LIDOCAINE HYDROCHLORIDE 10 ML: 10 INJECTION, SOLUTION INFILTRATION; PERINEURAL at 08:42

## 2024-11-15 RX ADMIN — FENTANYL CITRATE 50 MCG: 50 INJECTION, SOLUTION INTRAMUSCULAR; INTRAVENOUS at 08:50

## 2024-11-15 NOTE — H&P
[facility]   Interventional Radiology H&P    Patient Name: Alem Arroyo  : 1980  MRN: [id number]  Primary Care Physician:  Provider, No Known  Referring Physician: No Known Provider  Date of admission: 2024    Subjective   Subjective     HPI:  Alem Arroyo is a 44 y.o. male with perforated appendicitis.    Review of Systems:   Constitutional no fever,  no weight loss       Otolaryngeal no difficulty swallowing   Cardiovascular no chest pain   Pulmonary no cough, no sputum production   Gastrointestinal no constipation, no diarrhea                         Personal History       Past Medical/Surgical History: History reviewed. No pertinent past medical history.  History reviewed. No pertinent surgical history.    Social History:  reports that he has quit smoking. His smoking use included cigarettes. He started smoking about 22 months ago. He has never used smokeless tobacco. He reports that he does not use drugs.    Medications:  Medications Prior to Admission   Medication Sig Dispense Refill Last Dose/Taking    ibuprofen (ADVIL,MOTRIN) 200 MG tablet Take 3 tablets by mouth Every 6 (Six) Hours As Needed for Mild Pain.   2024 Morning     Current medications:  cefTRIAXone, 2,000 mg, Intravenous, Q24H   And  metroNIDAZOLE, 500 mg, Intravenous, Q8H      Current IV drips:  sodium chloride, 100 mL/hr, Last Rate: 100 mL/hr (11/15/24 0632)        Allergies:  No Known Allergies    Objective    Objective     Vitals:   Temp:  [98 °F (36.7 °C)-99.6 °F (37.6 °C)] 98.6 °F (37 °C)  Heart Rate:  [] 87  Resp:  [16-25] 22  BP: (118-178)/(71-94) 129/94      Physical Exam:   Constitutional: Awake, alert, No acute distress    Respiratory: Clear to auscultation bilaterally, nonlabored respirations    Cardiovascular: RRR, no murmurs, rubs, or gallops, palpable pedal pulses bilaterally   Gastrointestinal: Positive bowel sounds, soft, nontender, nondistended        ASA SCALE ASSESSMENT:  2-Mild to moderate  "systemic disease, medically well controlled, with no functional limitation    MALLAMPATI CLASSIFICATION:  2-Able to visualize the soft palate, fauces, uvula. The anterior & posterior tonsilar pillars are hidden by the tongue.       Result Review        Result Review:     Sodium   Date Value Ref Range Status   11/14/2024 128 (L) 136 - 145 mmol/L Final   11/14/2024 125 (L) 136 - 145 mmol/L Final       Potassium   Date Value Ref Range Status   11/14/2024 3.3 (L) 3.5 - 5.2 mmol/L Final   11/14/2024 3.3 (L) 3.5 - 5.2 mmol/L Final       Chloride   Date Value Ref Range Status   11/14/2024 90 (L) 98 - 107 mmol/L Final   11/14/2024 89 (L) 98 - 107 mmol/L Final       No results found for: \"PLASMABICARB\"    BUN   Date Value Ref Range Status   11/14/2024 18 6 - 20 mg/dL Final   11/14/2024 19 6 - 20 mg/dL Final       Creatinine   Date Value Ref Range Status   11/14/2024 0.90 0.76 - 1.27 mg/dL Final   11/14/2024 1.12 0.76 - 1.27 mg/dL Final       Calcium   Date Value Ref Range Status   11/14/2024 9.0 8.6 - 10.5 mg/dL Final   11/14/2024 9.0 8.6 - 10.5 mg/dL Final           No components found for: \"GLUCOSE.*\"  Results from last 7 days   Lab Units 11/14/24  2228   WBC 10*3/mm3 9.35   HEMOGLOBIN g/dL 13.7   HEMATOCRIT % 39.9   PLATELETS 10*3/mm3 221      Results from last 7 days   Lab Units 11/14/24  1759   INR  1.21*           Assessment / Plan     Assesment:   Perforated appendicitis.      Plan:   Pelvic drain placement    The risks and benefits of the procedure were discussed with the patient.    Electronically signed by Lyndon Andres MD, 11/15/24, 8:36 AM EST.   "

## 2024-11-15 NOTE — PLAN OF CARE
Goal Outcome Evaluation:  Plan of Care Reviewed With: patient        Progress: improving  Outcome Evaluation: Patient has been resting well throughout the shift. Remains on NS running at 100mL/hr and IV antibiotics. Had JAGUAR drain placed with IR this morning. Per patient, it has relieved pain and bloating. Back on regular diet. Potassium replaced and back up to 4.0. No other issues at this time.

## 2024-11-15 NOTE — PROGRESS NOTES
[facility] MEDICINE SERVICE  DAILY PROGRESS NOTE    NAME: Alem Arroyo  : 1980  MRN: [id number]      LOS: 1 day     PROVIDER OF SERVICE: Lino Guidry MD    Chief Complaint: Appendicitis    Subjective:     Interval History:  History taken from: patient    Patient seen and examined at bedside this morning.  No acute complaints overnight.  States that his pain has improved greatly since he had the IR drain placed.  Also received fentanyl so it could be that        Review of Systems: Negative except described above  Review of Systems    Objective:     Vital Signs  Temp:  [98 °F (36.7 °C)-99.6 °F (37.6 °C)] 98.3 °F (36.8 °C)  Heart Rate:  [] 91  Resp:  [15-25] 23  BP: (118-178)/(71-94) 134/86   Body mass index is 33.01 kg/m².    Physical Exam  Physical Exam  Constitutional:       Comments: NAD    Cardiovascular:      Comments:  RRR, S1 & S2   Pulmonary:      Comments:  Lungs CTA   Abdominal:      Comments:  ABD soft, NT.  JAGUAR drain            Diagnostic Data    Results from last 7 days   Lab Units 11/15/24  1012 24  2228 24  0945   WBC 10*3/mm3  --  9.35 9.91   HEMOGLOBIN g/dL  --  13.7 15.1   HEMATOCRIT %  --  39.9 43.2   PLATELETS 10*3/mm3  --  221 214   GLUCOSE mg/dL  --  127* 166*   CREATININE mg/dL  --  0.90 1.12   BUN mg/dL  --  18 19   SODIUM mmol/L  --  128* 125*   POTASSIUM mmol/L 4.0 3.3* 3.3*   AST (SGOT) U/L  --   --  45*   ALT (SGPT) U/L  --   --  72*   ALK PHOS U/L  --   --  83   BILIRUBIN mg/dL  --   --  1.9*   ANION GAP mmol/L  --  14.7 13.7       CT Guided Percutaneous Drain Peritoneal    Result Date: 11/15/2024  1. Successful CT-guided drain of abscess in the low pelvis Electronically Signed: Lyndon Andres MD  11/15/2024 9:03 AM EST  Workstation ID: [id number]    CT Abdomen Pelvis With Contrast    Result Date: 2024  Impression: 1. Findings compatible with acute appendicitis with appendiceal perforation and subsequent formation of lobular fluid  collections in the central right lower pelvis likely all contiguous compatible with abdominal abscess. There are reactive inflammatory changes involving the sigmoid colon, distal ileum and urinary bladder with some free intraperitoneal gas noted. 2. Findings were called and discussed with Dr. Arroyo by myself at 10:23 a.m. Electronically Signed: Linda Paul MD  11/14/2024 10:24 AM EST  Workstation ID: [id number]       I reviewed the patient's new clinical results.    Assessment/Plan:     Active and Resolved Problems  Active Hospital Problems    Diagnosis  POA    **Appendicitis [K37]  Yes      Resolved Hospital Problems   No resolved problems to display.       Acute appendicitis with perforation  CT imaging consistent with acute appendicitis with appendiceal perforation  General surgery following, recommend trial of nonop management with drain placement and IV antibiotics first  Patient underwent drain placement with IR on 11/15  Blood cultures and body fluid cultures ordered  Continue IV antibiotics -changed to Rocephin and Flagyl  Analgesics as needed  Antiemetics as needed       Hyponatremia  Hypokalemia  Replete and recheck  Maintenance IV fluids overnight  A.m. labs ordered to monitor  Slow correction of sodium     ADHD  Patient is not on any medications at home, just recently got off of Wellbutrin       VTE Prophylaxis:  Mechanical VTE prophylaxis orders are present.            Time: 35 minutes     Code Status and Medical Interventions: CPR (Attempt to Resuscitate); Full Support   Ordered at: 11/14/24 1127     Code Status (Patient has no pulse and is not breathing):    CPR (Attempt to Resuscitate)     Medical Interventions (Patient has pulse or is breathing):    Full Support       Signature: Electronically signed by Lino Guidry MD, 11/15/24, 11:44 EST.  [facility] Hospitalist Team

## 2024-11-15 NOTE — PLAN OF CARE
Goal Outcome Evaluation:         A/Ox4. Up Adlib. IV Abx. RLQ pain. See MAR. NPO at midnight. O2 stats dropped into upper 80's. Per patient no hx of apnea. Put on 2LNC. Slept between care.

## 2024-11-15 NOTE — CASE MANAGEMENT/SOCIAL WORK
Discharge Planning Assessment   Jaquan     Patient Name: Alem Arroyo  MRN: [id number]  Today's Date: 11/15/2024    Admit Date: 11/14/2024    Plan: Anticipate routine home.   Discharge Needs Assessment       Row Name 11/15/24 1051       Living Environment    People in Home spouse;child(matt), dependent    Name(s) of People in Home Wife-Rhe    Current Living Arrangements home    Potentially Unsafe Housing Conditions none    In the past 12 months has the electric, gas, oil, or water company threatened to shut off services in your home? No    Primary Care Provided by self    Provides Primary Care For child(matt)    Family Caregiver if Needed spouse    Family Caregiver Names Rhessa    Quality of Family Relationships helpful;involved;supportive    Able to Return to Prior Arrangements yes       Resource/Environmental Concerns    Resource/Environmental Concerns none    Transportation Concerns none       Transportation Needs    In the past 12 months, has lack of transportation kept you from medical appointments or from getting medications? no    In the past 12 months, has lack of transportation kept you from meetings, work, or from getting things needed for daily living? No       Food Insecurity    Within the past 12 months, you worried that your food would run out before you got the money to buy more. Never true    Within the past 12 months, the food you bought just didn't last and you didn't have money to get more. Never true       Transition Planning    Patient/Family Anticipates Transition to home;home with family    Patient/Family Anticipated Services at Transition none    Transportation Anticipated car, drives self;family or friend will provide       Discharge Needs Assessment    Readmission Within the Last 30 Days no previous admission in last 30 days    Equipment Currently Used at Home none    Concerns to be Addressed denies needs/concerns at this time;no discharge needs identified    Do you want help finding or  "keeping work or a job? Patient declined    Do you want help with school or training? For example, starting or completing job training or getting a high school diploma, GED or equivalent No    Anticipated Changes Related to Illness none    Equipment Needed After Discharge none    Provided Post Acute Provider List? N/A    Provided Post Acute Provider Quality & Resource List? N/A                   Discharge Plan       Row Name 11/15/24 1053       Plan    Plan Anticipate routine home.    Patient/Family in Agreement with Plan yes    Plan Comments CM met with patient at bedside. Pt lives at home with wife Joy and 11 year old son. Pt drives and is independent with ADL's. [facility]. Pharmacy is [facility]. No DME used at home. Wife to provide transportation. IR placed pelvic drain today, continues on IV abx. CM contacted [facility] , spoke to Emmy, and notified of hospital admission. [facility], \"refusal to transfer\" form not needed.              Demographic Summary       Row Name 11/15/24 1047       General Information    Admission Type inpatient    Arrived From emergency department    Referral Source admission list    Reason for Consult care coordination/care conference;discharge planning    Preferred Language English       Contact Information    Permission Granted to Share Info With                    Functional Status       Row Name 11/15/24 1050       Functional Status    Usual Activity Tolerance excellent    Current Activity Tolerance excellent       Functional Status, IADL    Medications independent    Meal Preparation independent    Housekeeping independent    Laundry independent    Shopping independent    If for any reason you need help with day-to-day activities such as bathing, preparing meals, shopping, managing finances, etc., do you get the help you need? I don't need any help      "        Megan Naegele, RN     Office Phone: [phone]  Office Cell: [phone]

## 2024-11-16 LAB
ANION GAP SERPL CALCULATED.3IONS-SCNC: 10.7 MMOL/L (ref 5–15)
BASOPHILS # BLD AUTO: 0.04 10*3/MM3 (ref 0–0.2)
BASOPHILS NFR BLD AUTO: 0.5 % (ref 0–1.5)
BUN SERPL-MCNC: 11 MG/DL (ref 6–20)
BUN/CREAT SERPL: 15.3 (ref 7–25)
CALCIUM SPEC-SCNC: 8.2 MG/DL (ref 8.6–10.5)
CHLORIDE SERPL-SCNC: 95 MMOL/L (ref 98–107)
CO2 SERPL-SCNC: 25.3 MMOL/L (ref 22–29)
CREAT SERPL-MCNC: 0.72 MG/DL (ref 0.76–1.27)
DEPRECATED RDW RBC AUTO: 43.2 FL (ref 37–54)
EGFRCR SERPLBLD CKD-EPI 2021: 114.8 ML/MIN/1.73
EOSINOPHIL # BLD AUTO: 0.09 10*3/MM3 (ref 0–0.4)
EOSINOPHIL NFR BLD AUTO: 1.1 % (ref 0.3–6.2)
ERYTHROCYTE [DISTWIDTH] IN BLOOD BY AUTOMATED COUNT: 12.1 % (ref 12.3–15.4)
GLUCOSE SERPL-MCNC: 115 MG/DL (ref 65–99)
HCT VFR BLD AUTO: 33.6 % (ref 37.5–51)
HGB BLD-MCNC: 11.5 G/DL (ref 13–17.7)
IMM GRANULOCYTES # BLD AUTO: 0.07 10*3/MM3 (ref 0–0.05)
IMM GRANULOCYTES NFR BLD AUTO: 0.9 % (ref 0–0.5)
LYMPHOCYTES # BLD AUTO: 0.82 10*3/MM3 (ref 0.7–3.1)
LYMPHOCYTES NFR BLD AUTO: 10.4 % (ref 19.6–45.3)
MCH RBC QN AUTO: 33.1 PG (ref 26.6–33)
MCHC RBC AUTO-ENTMCNC: 34.2 G/DL (ref 31.5–35.7)
MCV RBC AUTO: 96.8 FL (ref 79–97)
MONOCYTES # BLD AUTO: 0.86 10*3/MM3 (ref 0.1–0.9)
MONOCYTES NFR BLD AUTO: 10.9 % (ref 5–12)
NEUTROPHILS NFR BLD AUTO: 5.99 10*3/MM3 (ref 1.7–7)
NEUTROPHILS NFR BLD AUTO: 76.2 % (ref 42.7–76)
NRBC BLD AUTO-RTO: 0 /100 WBC (ref 0–0.2)
PLATELET # BLD AUTO: 189 10*3/MM3 (ref 140–450)
PMV BLD AUTO: 10.8 FL (ref 6–12)
POTASSIUM SERPL-SCNC: 3.2 MMOL/L (ref 3.5–5.2)
POTASSIUM SERPL-SCNC: 3.6 MMOL/L (ref 3.5–5.2)
QT INTERVAL: 359 MS
QTC INTERVAL: 417 MS
RBC # BLD AUTO: 3.47 10*6/MM3 (ref 4.14–5.8)
SODIUM SERPL-SCNC: 131 MMOL/L (ref 136–145)
WBC NRBC COR # BLD AUTO: 7.87 10*3/MM3 (ref 3.4–10.8)

## 2024-11-16 PROCEDURE — 93005 ELECTROCARDIOGRAM TRACING: CPT

## 2024-11-16 PROCEDURE — 85025 COMPLETE CBC W/AUTO DIFF WBC: CPT

## 2024-11-16 PROCEDURE — 99232 SBSQ HOSP IP/OBS MODERATE 35: CPT

## 2024-11-16 PROCEDURE — 25010000002 METRONIDAZOLE 500 MG/100ML SOLUTION

## 2024-11-16 PROCEDURE — 80048 BASIC METABOLIC PNL TOTAL CA: CPT

## 2024-11-16 PROCEDURE — 25010000002 CEFTRIAXONE PER 250 MG

## 2024-11-16 PROCEDURE — 93010 ELECTROCARDIOGRAM REPORT: CPT | Performed by: INTERNAL MEDICINE

## 2024-11-16 PROCEDURE — 84132 ASSAY OF SERUM POTASSIUM: CPT

## 2024-11-16 RX ORDER — METRONIDAZOLE 500 MG/1
500 TABLET ORAL 3 TIMES DAILY
Qty: 42 TABLET | Refills: 0 | Status: SHIPPED | OUTPATIENT
Start: 2024-11-16 | End: 2024-11-30

## 2024-11-16 RX ORDER — POTASSIUM CHLORIDE 1500 MG/1
40 TABLET, EXTENDED RELEASE ORAL EVERY 4 HOURS
Status: COMPLETED | OUTPATIENT
Start: 2024-11-16 | End: 2024-11-16

## 2024-11-16 RX ORDER — GUAIFENESIN 200 MG/10ML
200 LIQUID ORAL EVERY 4 HOURS PRN
Status: DISCONTINUED | OUTPATIENT
Start: 2024-11-16 | End: 2024-11-17 | Stop reason: HOSPADM

## 2024-11-16 RX ORDER — BENZONATATE 100 MG/1
100 CAPSULE ORAL 3 TIMES DAILY PRN
Status: DISCONTINUED | OUTPATIENT
Start: 2024-11-16 | End: 2024-11-17 | Stop reason: HOSPADM

## 2024-11-16 RX ADMIN — CEFTRIAXONE 2000 MG: 2 INJECTION, POWDER, FOR SOLUTION INTRAMUSCULAR; INTRAVENOUS at 17:02

## 2024-11-16 RX ADMIN — METRONIDAZOLE 500 MG: 500 INJECTION, SOLUTION INTRAVENOUS at 17:02

## 2024-11-16 RX ADMIN — HYDROCODONE BITARTRATE AND ACETAMINOPHEN 1 TABLET: 5; 325 TABLET ORAL at 19:36

## 2024-11-16 RX ADMIN — GUAIFENESIN 200 MG: 200 SOLUTION ORAL at 02:02

## 2024-11-16 RX ADMIN — METRONIDAZOLE 500 MG: 500 INJECTION, SOLUTION INTRAVENOUS at 00:39

## 2024-11-16 RX ADMIN — POTASSIUM CHLORIDE 40 MEQ: 1500 TABLET, EXTENDED RELEASE ORAL at 03:26

## 2024-11-16 RX ADMIN — POTASSIUM CHLORIDE 40 MEQ: 1500 TABLET, EXTENDED RELEASE ORAL at 09:06

## 2024-11-16 RX ADMIN — POTASSIUM CHLORIDE 40 MEQ: 1500 TABLET, EXTENDED RELEASE ORAL at 23:46

## 2024-11-16 RX ADMIN — POTASSIUM CHLORIDE 40 MEQ: 1500 TABLET, EXTENDED RELEASE ORAL at 19:30

## 2024-11-16 RX ADMIN — BENZONATATE 100 MG: 100 CAPSULE ORAL at 02:02

## 2024-11-16 RX ADMIN — BENZONATATE 100 MG: 100 CAPSULE ORAL at 17:02

## 2024-11-16 RX ADMIN — HYDROCODONE BITARTRATE AND ACETAMINOPHEN 1 TABLET: 5; 325 TABLET ORAL at 03:26

## 2024-11-16 RX ADMIN — GUAIFENESIN 200 MG: 200 SOLUTION ORAL at 17:02

## 2024-11-16 RX ADMIN — METRONIDAZOLE 500 MG: 500 INJECTION, SOLUTION INTRAVENOUS at 08:59

## 2024-11-16 RX ADMIN — Medication 5 MG: at 20:15

## 2024-11-16 NOTE — PROGRESS NOTES
General Surgery Progress Note    Name: Alem Arroyo ADMIT: 2024   : 1980  PCP: Kristin Alanis MD    MRN: [id number] LOS: 2 days   AGE/SEX: 44 y.o. male  ROOM: [address] [place] [address]    Chief Complaint   Patient presents with    Abdominal Pain     Subjective     Patient seen and examined.  Vital signs stable, afebrile.  Reports feeling a little better today.  Reports pain is improving.  Not much of an appetite, but tolerating p.o. intake without nausea or vomiting.  Having loose bowel movements.    Objective     Scheduled Medications:   cefTRIAXone, 2,000 mg, Intravenous, Q24H   And  metroNIDAZOLE, 500 mg, Intravenous, Q8H        Active Infusions:       As Needed Medications:    benzonatate    senna-docusate sodium **AND** polyethylene glycol **AND** bisacodyl **AND** bisacodyl    Calcium Replacement - Follow Nurse / BPA Driven Protocol    guaifenesin    HYDROcodone-acetaminophen    Magnesium Standard Dose Replacement - Follow Nurse / BPA Driven Protocol    melatonin    nitroglycerin    ondansetron ODT **OR** ondansetron    Phosphorus Replacement - Follow Nurse / BPA Driven Protocol    Potassium Replacement - Follow Nurse / BPA Driven Protocol    Vital Signs  Vital Signs Patient Vitals for the past 24 hrs:   BP Temp Temp src Pulse Resp SpO2   24 0807 121/73 98.4 °F (36.9 °C) Oral 85 20 95 %   24 0432 125/78 98.2 °F (36.8 °C) Oral 83 25 94 %   24 0024 133/82 98.3 °F (36.8 °C) Oral 91 22 96 %   11/15/24 1941 124/79 99.1 °F (37.3 °C) Oral 93 22 93 %   11/15/24 1710 126/74 98.1 °F (36.7 °C) Oral 99 23 92 %   11/15/24 1135 134/86 98.3 °F (36.8 °C) Oral 91 23 95 %     I/O:  I/O last 3 completed shifts:  In: 1251 [I.V.:1251]  Out: 130 [Drains:130]    Physical Exam:  Physical Exam  Constitutional:       General: He is not in acute distress.  Cardiovascular:      Rate and Rhythm: Normal rate.   Pulmonary:      Effort: Pulmonary effort is normal. No respiratory distress.   Abdominal:       Palpations: Abdomen is soft.      Tenderness: There is abdominal tenderness. There is no guarding.      Comments: Soft , Mild distention, appropriate tenderness.  JAGUAR x 1 present with dark sanguinous, opaque output.   Neurological:      Mental Status: He is alert. Mental status is at baseline.   Psychiatric:         Mood and Affect: Mood normal.         Behavior: Behavior normal.         Results Review:     CBC    Results from last 7 days   Lab Units 11/16/24  0038 11/14/24 2228 11/14/24  0945   WBC 10*3/mm3 7.87 9.35 9.91   HEMOGLOBIN g/dL 11.5* 13.7 15.1   PLATELETS 10*3/mm3 189 221 214     BMP   Results from last 7 days   Lab Units 11/16/24  0038 11/15/24  1012 11/14/24 2228 11/14/24  0945   SODIUM mmol/L 131*  --  128* 125*   POTASSIUM mmol/L 3.2* 4.0 3.3* 3.3*   CHLORIDE mmol/L 95*  --  90* 89*   CO2 mmol/L 25.3  --  23.3 22.3   BUN mg/dL 11  --  18 19   CREATININE mg/dL 0.72*  --  0.90 1.12   GLUCOSE mg/dL 115*  --  127* 166*     Radiology(recent) CT Guided Percutaneous Drain Peritoneal    Result Date: 11/15/2024  1. Successful CT-guided drain of abscess in the low pelvis Electronically Signed: Lyndon Andres MD  11/15/2024 9:03 AM EST  Workstation ID: [id number]     I reviewed the patient's new clinical results.    Assessment & Plan       Appendicitis    Appendicitis with perforation      44 y.o. male admitted 11/14/2024 with appendicitis with perforation, status post IR drain placement    -Diet as tolerated  -Pain medication as needed  -Trend WBC count, 17.87 (9.35) this morning  -Continue IV antibiotics  -Continue IR drain  -Overall, appears to be doing well.  Okay to discharge from general surgery perspective on 2 week course of PO antibiotics.  He will go home with IR drain in place.  He will need an outpatient CT of abdomen/pelvis in 2 weeks and follow-up with Dr. Martinez in office after completion.      This note was created using Dragon Voice Recognition software.    Melodie Alvarenga  PA  11/16/24  11:29 EST

## 2024-11-16 NOTE — PLAN OF CARE
Goal Outcome Evaluation:      Pt has moved up to a regular diet. Tolerated somewhat. IV-Abx. JAGUAR drain from the coccyx. Pt has only been in pain when up adlib to bathroom or when coughing Guaifenesin ordered along with Tussalin Pearls for cough.

## 2024-11-16 NOTE — DISCHARGE SUMMARY
"[facility] Medicine Services  Discharge Summary    Date of Service: 2024  Patient Name: Alem Arroyo  : 1980  MRN: [id number]    Date of Admission: 2024  Discharge Diagnosis: Appendicitis  Date of Discharge: 2024  Primary Care Physician: Kristin Alanis MD      Presenting Problem:   Appendicitis with perforation [K35.32]  Appendicitis [K37]    Active and Resolved Hospital Problems:  Active Hospital Problems    Diagnosis POA    **Appendicitis [K37] Yes    Appendicitis with perforation [K35.32] Yes      Resolved Hospital Problems   No resolved problems to display.         Hospital Course     HPI:    \"Alem Arroyo is a 44 y.o. male with a CMH of ADHD who presented to [facility] on 2024 as a transfer from [facility] after arriving with c/o abdominal pain since this past weekend. Patient attributed the pain to kidney stones, but once pain persisted until this morning he sought medical treatment. Patient complained of RLQ abdominal pain that worsened with movement. Patient reports he had occasional chills and diaphoresis but never formally checked a temperature at home so fever unknown.  Patient denies SOB, CP, dizziness, headache at this time.  Patient denies any known sick contacts.     On ED evaluation, patient tachycardic on arrival. Labs remarkable for sodium 125, potassium 3.3, chloride 89, elevated ALT/AST at 72/45, total bilirubin 1.9.  Initial lactic 1.2.  CT abdomen pelvis with contrast was performed showing \"Findings compatible with acute appendicitis with appendiceal perforation and subsequent formation of lobular fluid collections in the central right lower pelvis likely all contiguous compatible with abdominal abscess. There are reactive inflammatory   changes involving the sigmoid colon, distal ileum and urinary bladder with some free intraperitoneal gas noted\".  The ED provider spoke with Dr. Martinez with general surgery who " "agreed to consult on patient.  Case was discussed with Dr. Martinez who desired to do conservative management with IV antibiotics and possible percutaneous drain over surgical intervention at this time.  Patient was started on IV antibiotics and given IV pain medication and antiemetic in the ED. Hospitalist service to admit for further management.\"    Hospital Course:  Acute appendicitis with perforation  CT imaging consistent with acute appendicitis with appendiceal perforation  General surgery following, recommend trial of nonop management with drain placement and IV antibiotics first  Patient underwent drain placement with IR on 11/15  Blood cultures and body fluid cultures ordered  Continue IV antibiotics -changed to Rocephin and Flagyl while in the hospital  Analgesics as needed  Antiemetics as needed        Hyponatremia  Hypokalemia  Replete and recheck  Maintenance IV fluids overnight  A.m. labs ordered to monitor  Slow correction of sodium     ADHD  Patient is not on any medications at home, just recently got off of Wellbutrin     Patient seen and examined at bedside this morning.  States that he has been passing gas and having bowel movements as well.  He was seen by general surgery who stated that patient can be discharged on 2-week course of antibiotics and then will have him follow-up in their office with repeat CT abdomen/pelvis.  Patient states that he feels ready to go home.  All questions and concerns answered.  Prescription for antibiotics and CT abdomen/pelvis sent to pharmacy      DISCHARGE Follow Up Recommendations for labs and diagnostics: Follow-up with PCP in 1 week        Day of Discharge     Vital Signs:  Temp:  [98.1 °F (36.7 °C)-99.1 °F (37.3 °C)] 98.4 °F (36.9 °C)  Heart Rate:  [83-99] 85  Resp:  [20-25] 20  BP: (121-133)/(73-82) 121/73    Physical Exam:  Physical Exam  Constitutional:       Comments: NAD    Cardiovascular:      Comments:  RRR, S1 & S2   Pulmonary:      Comments:  Lungs " CTA   Abdominal:      Comments:  ABD soft, NT, JAGUAR drain             Pertinent  and/or Most Recent Results     LAB RESULTS:      Lab 11/16/24  0038 11/14/24  2228 11/14/24  1759 11/14/24  1108 11/14/24  0945   WBC 7.87 9.35  --   --  9.91   HEMOGLOBIN 11.5* 13.7  --   --  15.1   HEMATOCRIT 33.6* 39.9  --   --  43.2   PLATELETS 189 221  --   --  214   NEUTROS ABS 5.99 7.80*  --   --  8.21*   IMMATURE GRANS (ABS) 0.07* 0.06*  --   --  0.04   LYMPHS ABS 0.82 0.77  --   --  0.81   MONOS ABS 0.86 0.70  --   --  0.83   EOS ABS 0.09 0.01  --   --  0.00   MCV 96.8 94.1  --   --  93.3   LACTATE  --   --   --  1.2  --    PROTIME  --   --  15.3*  --   --          Lab 11/16/24  0038 11/15/24  1012 11/14/24  2228 11/14/24  0945   SODIUM 131*  --  128* 125*   POTASSIUM 3.2* 4.0 3.3* 3.3*   CHLORIDE 95*  --  90* 89*   CO2 25.3  --  23.3 22.3   ANION GAP 10.7  --  14.7 13.7   BUN 11  --  18 19   CREATININE 0.72*  --  0.90 1.12   EGFR 114.8  --  107.3 82.6   GLUCOSE 115*  --  127* 166*   CALCIUM 8.2*  --  9.0 9.0         Lab 11/14/24  0945   TOTAL PROTEIN 7.9   ALBUMIN 4.1   GLOBULIN 3.8   ALT (SGPT) 72*   AST (SGOT) 45*   BILIRUBIN 1.9*   ALK PHOS 83   LIPASE 9*         Lab 11/14/24  1759   PROTIME 15.3*   INR 1.21*                 Brief Urine Lab Results  (Last result in the past 365 days)        Color   Clarity   Blood   Leuk Est   Nitrite   Protein   CREAT   Urine HCG        11/14/24 1331 Dark Yellow   Cloudy   Trace   Negative   Negative   100 mg/dL (2+)                 Microbiology Results (last 10 days)       Procedure Component Value - Date/Time    Body Fluid Culture - Body Fluid, Peritoneum [[id number]]  (Abnormal) Collected: 11/15/24 0850    Lab Status: Preliminary result Specimen: Body Fluid from Peritoneum Updated: 11/16/24 1149     Body Fluid Culture Heavy growth (4+) Gram Negative Bacilli     Gram Stain Many (4+) WBCs per low power field      Few (2+) Gram negative bacilli      Few (2+) Gram positive bacilli      Few  (2+) Gram positive cocci in chains    Blood Culture - Blood, Arm, Left [[id number]]  (Normal) Collected: 11/14/24 2228    Lab Status: Preliminary result Specimen: Blood from Arm, Left Updated: 11/15/24 2315     Blood Culture No growth at 24 hours    Blood Culture - Blood, Arm, Left [[id number]]  (Normal) Collected: 11/14/24 1813    Lab Status: Preliminary result Specimen: Blood from Arm, Left Updated: 11/15/24 2030     Blood Culture No growth at 24 hours            CT Guided Percutaneous Drain Peritoneal    Result Date: 11/15/2024  Impression: 1. Successful CT-guided drain of abscess in the low pelvis Electronically Signed: Lyndon Andres MD  11/15/2024 9:03 AM EST  Workstation ID: [id number]    CT Abdomen Pelvis With Contrast    Result Date: 11/14/2024  Impression: Impression: 1. Findings compatible with acute appendicitis with appendiceal perforation and subsequent formation of lobular fluid collections in the central right lower pelvis likely all contiguous compatible with abdominal abscess. There are reactive inflammatory changes involving the sigmoid colon, distal ileum and urinary bladder with some free intraperitoneal gas noted. 2. Findings were called and discussed with Dr. Arroyo by myself at 10:23 a.m. Electronically Signed: Linda Paul MD  11/14/2024 10:24 AM EST  Workstation ID: [id number]                 Labs Pending at Discharge:  Pending Results       Procedure [Order ID] Specimen - Date/Time    Potassium [[id number]]     Specimen: Blood             Procedures Performed           Consults:   Consults       Date and Time Order Name Status Description    11/14/2024  3:03 PM Inpatient General Surgery Consult Completed               Discharge Details        Discharge Medications        ASK your doctor about these medications        Instructions Start Date   ibuprofen 200 MG tablet  Commonly known as: ADVIL,MOTRIN   600 mg, Every 6 Hours PRN               No Known Allergies      Discharge Disposition: home  with drain      Diet:  Hospital:  Diet Order   Procedures    Diet: Regular/House; Fluid Consistency: Thin (IDDSI 0)         Discharge Activity:         CODE STATUS:  Code Status and Medical Interventions: CPR (Attempt to Resuscitate); Full Support   Ordered at: 11/14/24 1127     Code Status (Patient has no pulse and is not breathing):    CPR (Attempt to Resuscitate)     Medical Interventions (Patient has pulse or is breathing):    Full Support         No future appointments.        Time spent on Discharge including face to face service:  35 minutes    Signature: Electronically signed by Lino Guidry MD, 11/16/24, 12:50 EST.  [facility] Hospitalist Team

## 2024-11-16 NOTE — PLAN OF CARE
Problem: Adult Inpatient Plan of Care  Goal: Plan of Care Review  Outcome: Progressing  Goal: Patient-Specific Goal (Individualized)  Outcome: Progressing  Goal: Absence of Hospital-Acquired Illness or Injury  Outcome: Progressing  Intervention: Identify and Manage Fall Risk  Recent Flowsheet Documentation  Taken 11/16/2024 1625 by Luciana Andrea LPN  Safety Promotion/Fall Prevention:   safety round/check completed   room organization consistent   assistive device/personal items within reach   clutter free environment maintained   nonskid shoes/slippers when out of bed  Taken 11/16/2024 1458 by Luciana Andrea LPN  Safety Promotion/Fall Prevention:   nonskid shoes/slippers when out of bed   safety round/check completed  Taken 11/16/2024 1254 by Luciana Andrea LPN  Safety Promotion/Fall Prevention:   safety round/check completed   room organization consistent   assistive device/personal items within reach   clutter free environment maintained   nonskid shoes/slippers when out of bed  Taken 11/16/2024 1058 by Luciana Andrea LPN  Safety Promotion/Fall Prevention:   nonskid shoes/slippers when out of bed   safety round/check completed   assistive device/personal items within reach   clutter free environment maintained  Taken 11/16/2024 0836 by Luciana Andrea LPN  Safety Promotion/Fall Prevention:   safety round/check completed   room organization consistent   assistive device/personal items within reach   clutter free environment maintained   nonskid shoes/slippers when out of bed  Intervention: Prevent and Manage VTE (Venous Thromboembolism) Risk  Recent Flowsheet Documentation  Taken 11/16/2024 0836 by Luciana Andrea LPN  VTE Prevention/Management:   SCDs (sequential compression devices) off   patient refused intervention  Intervention: Prevent Infection  Recent Flowsheet Documentation  Taken 11/16/2024 1625 by Luciana Andrea LPN  Infection Prevention:   single patient room provided   hand hygiene promoted   environmental surveillance  performed  Taken 11/16/2024 1254 by Luciana Andrea LPN  Infection Prevention: single patient room provided  Taken 11/16/2024 1058 by Lcuiana Andrea LPN  Infection Prevention: single patient room provided  Taken 11/16/2024 0836 by Luciana Andrea LPN  Infection Prevention:   single patient room provided   hand hygiene promoted  Goal: Optimal Comfort and Wellbeing  Outcome: Progressing  Intervention: Monitor Pain and Promote Comfort  Recent Flowsheet Documentation  Taken 11/16/2024 0836 by Luciana Andrea LPN  Pain Management Interventions: quiet environment facilitated  Intervention: Provide Person-Centered Care  Recent Flowsheet Documentation  Taken 11/16/2024 0836 by Luciana Andrea LPN  Trust Relationship/Rapport:   care explained   choices provided   emotional support provided   thoughts/feelings acknowledged   reassurance provided   questions encouraged   questions answered  Goal: Readiness for Transition of Care  Outcome: Progressing   Goal Outcome Evaluation:

## 2024-11-16 NOTE — PROGRESS NOTES
[facility] MEDICINE SERVICE  DAILY PROGRESS NOTE    NAME: Alem Arroyo  : 1980  MRN: [id number]      LOS: 2 days     PROVIDER OF SERVICE: Lino Guidry MD    Chief Complaint: Appendicitis    Subjective:     Interval History:  History taken from: patient    Patient seen and examined at bedside this morning.  States that he has been passing gas and having bowel movements as well.            Review of Systems: Negative except as described above  Review of Systems    Objective:     Vital Signs  Temp:  [98.1 °F (36.7 °C)-99.1 °F (37.3 °C)] 98.4 °F (36.9 °C)  Heart Rate:  [83-99] 85  Resp:  [20-25] 20  BP: (121-133)/(73-82) 121/73   Body mass index is 33.01 kg/m².    Physical Exam  Physical Exam  Constitutional:       Comments: NAD    Cardiovascular:      Comments:  RRR, S1 & S2   Pulmonary:      Comments:  Lungs CTA   Abdominal:      Comments:  ABD soft, NT            Diagnostic Data    Results from last 7 days   Lab Units 24  0038 24  2228 24  0945   WBC 10*3/mm3 7.87   < > 9.91   HEMOGLOBIN g/dL 11.5*   < > 15.1   HEMATOCRIT % 33.6*   < > 43.2   PLATELETS 10*3/mm3 189   < > 214   GLUCOSE mg/dL 115*   < > 166*   CREATININE mg/dL 0.72*   < > 1.12   BUN mg/dL 11   < > 19   SODIUM mmol/L 131*   < > 125*   POTASSIUM mmol/L 3.2*   < > 3.3*   AST (SGOT) U/L  --   --  45*   ALT (SGPT) U/L  --   --  72*   ALK PHOS U/L  --   --  83   BILIRUBIN mg/dL  --   --  1.9*   ANION GAP mmol/L 10.7   < > 13.7    < > = values in this interval not displayed.       CT Guided Percutaneous Drain Peritoneal    Result Date: 11/15/2024  1. Successful CT-guided drain of abscess in the low pelvis Electronically Signed: Lyndon Andres MD  11/15/2024 9:03 AM EST  Workstation ID: [id number]       I reviewed the patient's new clinical results.    Assessment/Plan:     Active and Resolved Problems  Active Hospital Problems    Diagnosis  POA    **Appendicitis [K37]  Yes    Appendicitis with perforation [K35.32]   Yes      Resolved Hospital Problems   No resolved problems to display.       Acute appendicitis with perforation  CT imaging consistent with acute appendicitis with appendiceal perforation  General surgery following, recommend trial of nonop management with drain placement and IV antibiotics first  Patient underwent drain placement with IR on 11/15  Blood cultures NGTD and body fluid cultures pending  Continue IV antibiotics -changed to Rocephin and Flagyl while in the hospital  Analgesics as needed  Antiemetics as needed  He was seen by general surgery who stated that patient can be discharged on 2-week course of antibiotics and then will have him follow-up in their office with repeat CT abdomen/pelvis.        Hyponatremia  Hypokalemia  Replete and recheck  Maintenance IV fluids overnight  A.m. labs ordered to monitor  Slow correction of sodium     ADHD  Patient is not on any medications at home, just recently got off of Wellbutrin          VTE Prophylaxis:  Mechanical VTE prophylaxis orders are present.                Time: 35 minutes     Code Status and Medical Interventions: CPR (Attempt to Resuscitate); Full Support   Ordered at: 11/14/24 1127     Code Status (Patient has no pulse and is not breathing):    CPR (Attempt to Resuscitate)     Medical Interventions (Patient has pulse or is breathing):    Full Support       Signature: Electronically signed by Lino Guidry MD, 11/16/24, 12:56 EST.  [facility] Hospitalist Team

## 2024-11-17 VITALS
SYSTOLIC BLOOD PRESSURE: 129 MMHG | HEART RATE: 87 BPM | HEIGHT: 78 IN | TEMPERATURE: 98.3 F | OXYGEN SATURATION: 95 % | DIASTOLIC BLOOD PRESSURE: 85 MMHG | BODY MASS INDEX: 33.9 KG/M2 | RESPIRATION RATE: 16 BRPM | WEIGHT: 293 LBS

## 2024-11-17 LAB
ANION GAP SERPL CALCULATED.3IONS-SCNC: 13.4 MMOL/L (ref 5–15)
BACTERIA FLD CULT: ABNORMAL
BACTERIA FLD CULT: ABNORMAL
BASOPHILS # BLD AUTO: 0.05 10*3/MM3 (ref 0–0.2)
BASOPHILS NFR BLD AUTO: 0.7 % (ref 0–1.5)
BUN SERPL-MCNC: 9 MG/DL (ref 6–20)
BUN/CREAT SERPL: 14.3 (ref 7–25)
CALCIUM SPEC-SCNC: 8.8 MG/DL (ref 8.6–10.5)
CHLORIDE SERPL-SCNC: 95 MMOL/L (ref 98–107)
CO2 SERPL-SCNC: 24.6 MMOL/L (ref 22–29)
CREAT SERPL-MCNC: 0.63 MG/DL (ref 0.76–1.27)
DEPRECATED RDW RBC AUTO: 43.2 FL (ref 37–54)
EGFRCR SERPLBLD CKD-EPI 2021: 119.5 ML/MIN/1.73
EOSINOPHIL # BLD AUTO: 0.23 10*3/MM3 (ref 0–0.4)
EOSINOPHIL NFR BLD AUTO: 3.3 % (ref 0.3–6.2)
ERYTHROCYTE [DISTWIDTH] IN BLOOD BY AUTOMATED COUNT: 12.2 % (ref 12.3–15.4)
GLUCOSE SERPL-MCNC: 94 MG/DL (ref 65–99)
GRAM STN SPEC: ABNORMAL
HCT VFR BLD AUTO: 36.6 % (ref 37.5–51)
HGB BLD-MCNC: 12.5 G/DL (ref 13–17.7)
IMM GRANULOCYTES # BLD AUTO: 0.18 10*3/MM3 (ref 0–0.05)
IMM GRANULOCYTES NFR BLD AUTO: 2.6 % (ref 0–0.5)
LYMPHOCYTES # BLD AUTO: 1.29 10*3/MM3 (ref 0.7–3.1)
LYMPHOCYTES NFR BLD AUTO: 18.8 % (ref 19.6–45.3)
MCH RBC QN AUTO: 33.1 PG (ref 26.6–33)
MCHC RBC AUTO-ENTMCNC: 34.2 G/DL (ref 31.5–35.7)
MCV RBC AUTO: 96.8 FL (ref 79–97)
MONOCYTES # BLD AUTO: 0.85 10*3/MM3 (ref 0.1–0.9)
MONOCYTES NFR BLD AUTO: 12.4 % (ref 5–12)
NEUTROPHILS NFR BLD AUTO: 4.27 10*3/MM3 (ref 1.7–7)
NEUTROPHILS NFR BLD AUTO: 62.2 % (ref 42.7–76)
NRBC BLD AUTO-RTO: 0 /100 WBC (ref 0–0.2)
PLATELET # BLD AUTO: 247 10*3/MM3 (ref 140–450)
PMV BLD AUTO: 10.2 FL (ref 6–12)
POTASSIUM SERPL-SCNC: 3.5 MMOL/L (ref 3.5–5.2)
RBC # BLD AUTO: 3.78 10*6/MM3 (ref 4.14–5.8)
SODIUM SERPL-SCNC: 133 MMOL/L (ref 136–145)
WBC NRBC COR # BLD AUTO: 6.87 10*3/MM3 (ref 3.4–10.8)

## 2024-11-17 PROCEDURE — 85025 COMPLETE CBC W/AUTO DIFF WBC: CPT

## 2024-11-17 PROCEDURE — 80048 BASIC METABOLIC PNL TOTAL CA: CPT

## 2024-11-17 PROCEDURE — 25010000002 METRONIDAZOLE 500 MG/100ML SOLUTION

## 2024-11-17 RX ORDER — CIPROFLOXACIN 750 MG/1
750 TABLET, FILM COATED ORAL 2 TIMES DAILY
Qty: 26 TABLET | Refills: 0 | Status: SHIPPED | OUTPATIENT
Start: 2024-11-17 | End: 2024-11-30

## 2024-11-17 RX ORDER — POTASSIUM CHLORIDE 1500 MG/1
40 TABLET, EXTENDED RELEASE ORAL EVERY 4 HOURS
Status: COMPLETED | OUTPATIENT
Start: 2024-11-17 | End: 2024-11-17

## 2024-11-17 RX ADMIN — METRONIDAZOLE 500 MG: 500 INJECTION, SOLUTION INTRAVENOUS at 09:48

## 2024-11-17 RX ADMIN — METRONIDAZOLE 500 MG: 500 INJECTION, SOLUTION INTRAVENOUS at 00:35

## 2024-11-17 RX ADMIN — POTASSIUM CHLORIDE 40 MEQ: 1500 TABLET, EXTENDED RELEASE ORAL at 05:54

## 2024-11-17 RX ADMIN — POTASSIUM CHLORIDE 40 MEQ: 1500 TABLET, EXTENDED RELEASE ORAL at 09:48

## 2024-11-17 NOTE — DISCHARGE INSTR - APPOINTMENTS
Follow-up with me in 2 weeks with repeat CT scan of the abdomen and pelvis for drain removal and to discuss interval appendectomy at that time.       Singh Martinez MD  Breast Surgery General Surgery [phone] [facility] [address] [place] [address]

## 2024-11-17 NOTE — PLAN OF CARE
Goal Outcome Evaluation:               Pt has slept most of shift between care. A/O x4. Room Air. Up adlib. Tolerated IV abx. Potassium replacement given.

## 2024-11-18 NOTE — CASE MANAGEMENT/SOCIAL WORK
Case Management Discharge Note      Final Note: Routine home.      Selected Continued Care - Discharged on 11/17/2024 Admission date: 11/14/2024 - Discharge disposition: Home or Self Care       Transportation Services  Private: Car    Final Discharge Disposition Code: 01 - home or self-care

## 2024-11-19 LAB
BACTERIA SPEC AEROBE CULT: NORMAL
BACTERIA SPEC AEROBE CULT: NORMAL

## 2024-12-03 ENCOUNTER — HOSPITAL ENCOUNTER (OUTPATIENT)
Dept: CT IMAGING | Facility: HOSPITAL | Age: 44
Discharge: HOME | End: 2024-12-03
Payer: OTHER GOVERNMENT

## 2024-12-03 DIAGNOSIS — K35.32 APPENDICITIS WITH PERFORATION: ICD-10-CM

## 2024-12-03 PROCEDURE — 25510000001 IOPAMIDOL PER 1 ML

## 2024-12-03 PROCEDURE — 74177 CT ABD & PELVIS W/CONTRAST: CPT

## 2024-12-03 RX ORDER — IOPAMIDOL 755 MG/ML
100 INJECTION, SOLUTION INTRAVASCULAR
Status: COMPLETED | OUTPATIENT
Start: 2024-12-03 | End: 2024-12-03

## 2024-12-03 RX ADMIN — IOPAMIDOL 100 ML: 755 INJECTION, SOLUTION INTRAVENOUS at 12:54

## 2024-12-04 ENCOUNTER — OFFICE VISIT (OUTPATIENT)
Dept: SURGERY | Facility: CLINIC | Age: 44
End: 2024-12-04
Payer: OTHER GOVERNMENT

## 2024-12-04 VITALS
SYSTOLIC BLOOD PRESSURE: 137 MMHG | HEIGHT: 78 IN | RESPIRATION RATE: 20 BRPM | TEMPERATURE: 96.8 F | DIASTOLIC BLOOD PRESSURE: 99 MMHG | WEIGHT: 279.9 LBS | BODY MASS INDEX: 32.38 KG/M2 | OXYGEN SATURATION: 99 % | HEART RATE: 99 BPM

## 2024-12-04 DIAGNOSIS — K35.32 APPENDICITIS WITH PERFORATION: Primary | ICD-10-CM

## 2024-12-04 PROCEDURE — 99214 OFFICE O/P EST MOD 30 MIN: CPT | Performed by: SURGERY

## 2024-12-04 RX ORDER — METHYLPHENIDATE HYDROCHLORIDE 18 MG/1
18 TABLET, EXTENDED RELEASE ORAL EVERY MORNING
COMMUNITY

## 2024-12-09 ENCOUNTER — HOSPITAL ENCOUNTER (OUTPATIENT)
Dept: INTERVENTIONAL RADIOLOGY/VASCULAR | Facility: HOSPITAL | Age: 44
Discharge: HOME | End: 2024-12-09
Admitting: SURGERY
Payer: OTHER GOVERNMENT

## 2024-12-09 VITALS
SYSTOLIC BLOOD PRESSURE: 147 MMHG | HEART RATE: 79 BPM | RESPIRATION RATE: 14 BRPM | OXYGEN SATURATION: 91 % | DIASTOLIC BLOOD PRESSURE: 104 MMHG

## 2024-12-09 PROCEDURE — 25010000002 MIDAZOLAM PER 1 MG: Performed by: RADIOLOGY

## 2024-12-09 PROCEDURE — 25010000002 LIDOCAINE 1 % SOLUTION: Performed by: RADIOLOGY

## 2024-12-09 PROCEDURE — C1887 CATHETER, GUIDING: HCPCS

## 2024-12-09 PROCEDURE — 99152 MOD SED SAME PHYS/QHP 5/>YRS: CPT

## 2024-12-09 PROCEDURE — 75984 XRAY CONTROL CATHETER CHANGE: CPT

## 2024-12-09 PROCEDURE — C1769 GUIDE WIRE: HCPCS

## 2024-12-09 PROCEDURE — C1729 CATH, DRAINAGE: HCPCS

## 2024-12-09 PROCEDURE — 25010000002 ONDANSETRON PER 1 MG: Performed by: RADIOLOGY

## 2024-12-09 PROCEDURE — 99153 MOD SED SAME PHYS/QHP EA: CPT

## 2024-12-09 PROCEDURE — 25010000002 FENTANYL CITRATE (PF) 50 MCG/ML SOLUTION: Performed by: RADIOLOGY

## 2024-12-09 PROCEDURE — 25510000001 IOPAMIDOL PER 1 ML: Performed by: SURGERY

## 2024-12-09 RX ORDER — MIDAZOLAM HYDROCHLORIDE 1 MG/ML
INJECTION, SOLUTION INTRAMUSCULAR; INTRAVENOUS AS NEEDED
Status: COMPLETED | OUTPATIENT
Start: 2024-12-09 | End: 2024-12-09

## 2024-12-09 RX ORDER — FENTANYL CITRATE 50 UG/ML
INJECTION, SOLUTION INTRAMUSCULAR; INTRAVENOUS AS NEEDED
Status: COMPLETED | OUTPATIENT
Start: 2024-12-09 | End: 2024-12-09

## 2024-12-09 RX ORDER — LIDOCAINE HYDROCHLORIDE 10 MG/ML
INJECTION, SOLUTION INFILTRATION; PERINEURAL AS NEEDED
Status: COMPLETED | OUTPATIENT
Start: 2024-12-09 | End: 2024-12-09

## 2024-12-09 RX ORDER — ONDANSETRON 2 MG/ML
INJECTION INTRAMUSCULAR; INTRAVENOUS AS NEEDED
Status: COMPLETED | OUTPATIENT
Start: 2024-12-09 | End: 2024-12-09

## 2024-12-09 RX ORDER — IOPAMIDOL 755 MG/ML
10 INJECTION, SOLUTION INTRAVASCULAR
Status: COMPLETED | OUTPATIENT
Start: 2024-12-09 | End: 2024-12-09

## 2024-12-09 RX ADMIN — FENTANYL CITRATE 50 MCG: 50 INJECTION, SOLUTION INTRAMUSCULAR; INTRAVENOUS at 13:00

## 2024-12-09 RX ADMIN — MIDAZOLAM 1 MG: 1 INJECTION INTRAMUSCULAR; INTRAVENOUS at 12:57

## 2024-12-09 RX ADMIN — ONDANSETRON 4 MG: 2 INJECTION INTRAMUSCULAR; INTRAVENOUS at 12:57

## 2024-12-09 RX ADMIN — FENTANYL CITRATE 100 MCG: 50 INJECTION, SOLUTION INTRAMUSCULAR; INTRAVENOUS at 12:57

## 2024-12-09 RX ADMIN — IOPAMIDOL 12 ML: 755 INJECTION, SOLUTION INTRAVENOUS at 13:19

## 2024-12-09 RX ADMIN — LIDOCAINE HYDROCHLORIDE 10 ML: 10 INJECTION, SOLUTION INFILTRATION; PERINEURAL at 13:05

## 2024-12-09 NOTE — PRE-PROCEDURE NOTE
".  [facility]   Interventional Radiology H&P    Patient Name: Alem Arroyo  : 1980  MRN: [id number]  Primary Care Physician:  Kristin Alanis MD  Referring Physician: Singh Martinez,*  Date of admission: 2024    Subjective   Subjective     HPI:  Alem Arroyo is a 44 y.o. male with appendicitis and periappendiceal abscess    Review of Systems:   Constitutional no fever,  no weight loss       Otolaryngeal no difficulty swallowing   Cardiovascular no chest pain   Pulmonary no cough, no sputum production   Gastrointestinal no constipation, no diarrhea                         Personal History       Past Medical/Surgical History: No past medical history on file.  No past surgical history on file.    Social History:  reports that he has quit smoking. His smoking use included cigarettes. He started smoking about 23 months ago. He has been exposed to tobacco smoke. He has never used smokeless tobacco. He reports that he does not currently use alcohol. He reports that he does not use drugs.    Medications:  (Not in a hospital admission)    Current medications:  iopamidol, 10 mL, Injection, Once in imaging      Current IV drips:       Allergies:  No Known Allergies    Objective    Objective     Vitals:          Physical Exam:   Constitutional: Awake, alert, No acute distress    Respiratory: Clear to auscultation bilaterally, nonlabored respirations    Cardiovascular: RRR, no murmurs, rubs, or gallops, palpable pedal pulses bilaterally   Gastrointestinal: Positive bowel sounds, soft, nontender, nondistended        ASA SCALE ASSESSMENT:  1-Healthy patient, no disease outside surgical process    MALLAMPATI CLASSIFICATION:  1-Able to visualize the soft palate, fauces, uvula, anterior & posterior tonsilar pillars.       Result Review        Result Review:     No results found for: \"NA\"    No results found for: \"K\"    No results found for: \"CL\"    No results found for: \"PLASMABICARB\"    No " "results found for: \"BUN\"    No results found for: \"CREATININE\"    No results found for: \"CALCIUM\"        No components found for: \"GLUCOSE.*\"                 Assessment / Plan     Assesment:   Periappendiceal abscess with residual fluid collection      Plan:   Drain reposition with anticipated appendectomy in a few weeks.    The risks and benefits of the procedure were discussed with the patient.    Electronically signed by Singh Gallardo III, MD, 12/09/24, 12:53 PM EST.   "

## 2024-12-09 NOTE — PROGRESS NOTES
GENERAL SURGERY ESTABLISHED PATIENT NOTE    Patient Care Team:  Kristin Alanis MD as PCP - General (Family Medicine)    Reason for follow-up: Drain placement for perforated appendicitis    Subjective     Patient is a 44 y.o. male presents with a drain which is in place within an abscess for perforated appendicitis.  The patient was seen in the hospital where he was found to have perforated appendicitis and a large periappendiceal abscess extending into the pelvis.  We discussed the risk, benefits, and alternatives to surgery, patient agreed to proceed with IR placement of a drain followed by interval appendectomy.  He presents today with a repeat CT scan of the abdomen and pelvis to potentially have his drain removed.  He reports that he is doing better, and that the drain has been under 25 mL purulent output the last few days.  CT scan of the abdomen and pelvis however continues to demonstrate retained abscesses.     Review of Systems   Constitutional:  Negative for fever.   Gastrointestinal:  Positive for abdominal pain.        History  No past medical history on file.  No past surgical history on file.  No family history on file.  Social History     Tobacco Use    Smoking status: Former     Types: Cigarettes     Start date: 01/2023     Passive exposure: Past    Smokeless tobacco: Never   Vaping Use    Vaping status: Never Used   Substance Use Topics    Alcohol use: Not Currently    Drug use: Never     (Not in a hospital admission)    Allergies:  Patient has no known allergies.    Objective     Vital Signs       Physical Exam  Vitals reviewed.   Constitutional:       Appearance: He is well-developed.   HENT:      Head: Normocephalic and atraumatic.   Eyes:      Pupils: Pupils are equal, round, and reactive to light.   Cardiovascular:      Rate and Rhythm: Normal rate and regular rhythm.   Pulmonary:      Effort: Pulmonary effort is normal.      Breath sounds: Normal breath sounds.   Abdominal:       General: There is no distension.      Palpations: Abdomen is soft.      Tenderness: There is no abdominal tenderness.      Hernia: No hernia is present.      Comments: IR drain with continued purulent output   Musculoskeletal:         General: Normal range of motion.      Cervical back: Normal range of motion.   Lymphadenopathy:      Cervical: No cervical adenopathy.   Skin:     General: Skin is warm and dry.      Findings: No rash.   Neurological:      Mental Status: He is alert and oriented to person, place, and time.         Results Review:   Lab Results (last 24 hours)       ** No results found for the last 24 hours. **          No radiology results for the last day      I reviewed the patient's new imaging results and agree with the interpretation.  I reviewed the patient's other test results and agree with the interpretation    Assessment & Plan     Perforated appendicitis  IR drain in place    Patient presents today for drain removal, however he does have significant undrained abscess on CT scan  Images reviewed with patient in the office  Case discussed with IR attending who recommends having the patient return to IR to see about manipulation into these other abscess areas.  Will continue the patient on antibiotics, will prescribe p.o. Augmentin  Recommend probiotic for diarrhea  Follow-up with me in 1 week    I discussed the patients findings and my recommendations with the patient.     Singh Martinez MD  12/09/24  10:41 EST

## 2024-12-11 ENCOUNTER — OFFICE VISIT (OUTPATIENT)
Dept: SURGERY | Facility: CLINIC | Age: 44
End: 2024-12-11
Payer: OTHER GOVERNMENT

## 2024-12-11 VITALS
HEIGHT: 78 IN | DIASTOLIC BLOOD PRESSURE: 97 MMHG | SYSTOLIC BLOOD PRESSURE: 147 MMHG | RESPIRATION RATE: 18 BRPM | BODY MASS INDEX: 32.4 KG/M2 | HEART RATE: 78 BPM | OXYGEN SATURATION: 96 % | WEIGHT: 280 LBS | TEMPERATURE: 97.7 F

## 2024-12-11 DIAGNOSIS — K35.32 APPENDICITIS WITH PERFORATION: Primary | ICD-10-CM

## 2024-12-11 PROCEDURE — 99214 OFFICE O/P EST MOD 30 MIN: CPT | Performed by: SURGERY

## 2024-12-11 RX ORDER — SODIUM CHLORIDE 0.9 % (FLUSH) 0.9 %
10 SYRINGE (ML) INJECTION EVERY 12 HOURS SCHEDULED
OUTPATIENT
Start: 2024-12-11

## 2024-12-11 RX ORDER — CHLORHEXIDINE GLUCONATE ORAL RINSE 1.2 MG/ML
15 SOLUTION DENTAL EVERY 12 HOURS SCHEDULED
OUTPATIENT
Start: 2024-12-11

## 2024-12-11 RX ORDER — SODIUM CHLORIDE 0.9 % (FLUSH) 0.9 %
10 SYRINGE (ML) INJECTION AS NEEDED
OUTPATIENT
Start: 2024-12-11

## 2024-12-11 NOTE — PROGRESS NOTES
GENERAL SURGERY ESTABLISHED PATIENT NOTE    Patient Care Team:  Kristin Alanis MD as PCP - General (Family Medicine)  Singh Martinez MD as Surgeon (General Surgery)    Reason for follow-up: perforated appendicitis    Subjective     Patient is a 44 y.o. male     History of Present Illness  The patient is a 44-year-old male who presents for follow-up of perforated appendicitis.    He underwent a procedure on Monday, during which Dr. Gallardo repositioned his drain. He reports that the procedure was not overly painful due to the administration of medication. However, he has been experiencing discomfort since the procedure, describing it as a sensation of movement within his body. This discomfort is particularly noticeable when he bends over incorrectly, but it is not constant. He also reports an increased awareness of the drain's presence, which was not the case prior to the procedure. He is not experiencing any radiating pain. The drain was initially filled with blood, but this has since subsided. He is currently on a 10-day course of amoxicillin and is inquiring about the necessity of continuing this treatment. He has been experiencing diarrhea, which he attributes to the antibiotics, and has not had regular bowel movements since the onset of his symptoms. He also reports frequent bloating, describing it as a feeling of pressure in his stomach. He is eager to resume running and is seeking advice on when it would be safe to do so. He is planning to travel to [place] in late January 2025 and is concerned about the timing of his surgery in relation to this trip. He is also seeking advice on when he can return to work following his surgery.    MEDICATIONS  Current: amoxicillin       Review of Systems   Constitutional:  Negative for fever.   Gastrointestinal:  Negative for abdominal pain.        History  No past medical history on file.  No past surgical history on file.  No family history on  file.  Social History     Tobacco Use    Smoking status: Former     Types: Cigarettes     Start date: 01/2023     Passive exposure: Past    Smokeless tobacco: Never   Vaping Use    Vaping status: Never Used   Substance Use Topics    Alcohol use: Not Currently    Drug use: Never     (Not in a hospital admission)    Allergies:  Patient has no known allergies.    Objective     Vital Signs  Temp:  [97.7 °F (36.5 °C)] 97.7 °F (36.5 °C)  Heart Rate:  [78] 78  Resp:  [18] 18  BP: (147)/(97) 147/97    Physical Exam  Abdominal:      Palpations: Abdomen is soft.      Tenderness: There is no abdominal tenderness. There is no guarding or rebound.      Comments: JAGUAR drain with purulent serosanguinous output         Physical Exam  Abdominal exam was performed.       Results Review:   Lab Results (last 24 hours)       ** No results found for the last 24 hours. **          IR Drainage catheter exchange    Result Date: 12/9/2024  Impression: Successful repositioning of the 12 Wolof drain into the cephalad component. This was hooked to bulb suction drainage. The patient is to follow-up with Dr. Martinez for definitive surgery. Electronically Signed: Singh Gallardo MD  12/9/2024 2:53 PM EST  Workstation ID: [id number]     Results  Imaging  CT scan showed a possible connection from the appendix to the abscess, indicating a fistula or sinus tract.         I reviewed the patient's new imaging results and agree with the interpretation.  I reviewed the patient's other test results and agree with the interpretation    Assessment & Plan     Assessment & Plan  1. Perforated appendicitis.  The patient's condition is stable, with no significant pain reported. The drain is effectively managing the abscess, and there is evidence of inflammation subsiding. A CT scan revealed a potential fistula or sinus tract connecting the appendix to the abscess. The plan is to keep the drain in place until surgery to prevent further complications. A laparoscopic  appendectomy is scheduled for 01/02/2025. The procedure will involve making small incisions to remove the appendix and possibly placing another drain if necessary. The risks of the surgery, including bleeding, infection, and the possibility of converting to an open procedure, were discussed. He was advised to discontinue amoxicillin after the current 10-day course unless symptoms such as fever, worsening abdominal pain, or severe diarrhea develop, which could indicate a worsening infection. Post-surgery, he should avoid heavy lifting for approximately 4 weeks and can resume light jogging 2 weeks after the procedure. He was advised to take at least one week off work post-surgery and to monitor bowel movements for any changes. If bowel movements become more liquid, he should inform the provider. If a fever exceeding 101.5 degrees occurs, he should contact the provider immediately.    PROCEDURE  The patient underwent a drain repositioning procedure on Monday, performed by Dr. Daniel.       Active Problems:    * No active hospital problems. *      I discussed the patients findings and my recommendations with patient.     Singh Martinez MD  12/11/24  09:03 EST    Patient or patient representative verbalized consent for the use of Ambient Listening during the visit with  Singh Martinez MD for chart documentation. 12/11/2024  09:26 EST

## 2024-12-11 NOTE — H&P (VIEW-ONLY)
GENERAL SURGERY ESTABLISHED PATIENT NOTE    Patient Care Team:  Kristin Alanis MD as PCP - General (Family Medicine)  Singh Martinez MD as Surgeon (General Surgery)    Reason for follow-up: perforated appendicitis    Subjective     Patient is a 44 y.o. male     History of Present Illness  The patient is a 44-year-old male who presents for follow-up of perforated appendicitis.    He underwent a procedure on Monday, during which Dr. Gallardo repositioned his drain. He reports that the procedure was not overly painful due to the administration of medication. However, he has been experiencing discomfort since the procedure, describing it as a sensation of movement within his body. This discomfort is particularly noticeable when he bends over incorrectly, but it is not constant. He also reports an increased awareness of the drain's presence, which was not the case prior to the procedure. He is not experiencing any radiating pain. The drain was initially filled with blood, but this has since subsided. He is currently on a 10-day course of amoxicillin and is inquiring about the necessity of continuing this treatment. He has been experiencing diarrhea, which he attributes to the antibiotics, and has not had regular bowel movements since the onset of his symptoms. He also reports frequent bloating, describing it as a feeling of pressure in his stomach. He is eager to resume running and is seeking advice on when it would be safe to do so. He is planning to travel to [place] in late January 2025 and is concerned about the timing of his surgery in relation to this trip. He is also seeking advice on when he can return to work following his surgery.    MEDICATIONS  Current: amoxicillin       Review of Systems   Constitutional:  Negative for fever.   Gastrointestinal:  Negative for abdominal pain.        History  No past medical history on file.  No past surgical history on file.  No family history on  file.  Social History     Tobacco Use    Smoking status: Former     Types: Cigarettes     Start date: 01/2023     Passive exposure: Past    Smokeless tobacco: Never   Vaping Use    Vaping status: Never Used   Substance Use Topics    Alcohol use: Not Currently    Drug use: Never     (Not in a hospital admission)    Allergies:  Patient has no known allergies.    Objective     Vital Signs  Temp:  [97.7 °F (36.5 °C)] 97.7 °F (36.5 °C)  Heart Rate:  [78] 78  Resp:  [18] 18  BP: (147)/(97) 147/97    Physical Exam  Abdominal:      Palpations: Abdomen is soft.      Tenderness: There is no abdominal tenderness. There is no guarding or rebound.      Comments: JAGUAR drain with purulent serosanguinous output         Physical Exam  Abdominal exam was performed.       Results Review:   Lab Results (last 24 hours)       ** No results found for the last 24 hours. **          IR Drainage catheter exchange    Result Date: 12/9/2024  Impression: Successful repositioning of the 12 Polish drain into the cephalad component. This was hooked to bulb suction drainage. The patient is to follow-up with Dr. Martinez for definitive surgery. Electronically Signed: Singh Gallardo MD  12/9/2024 2:53 PM EST  Workstation ID: [id number]     Results  Imaging  CT scan showed a possible connection from the appendix to the abscess, indicating a fistula or sinus tract.         I reviewed the patient's new imaging results and agree with the interpretation.  I reviewed the patient's other test results and agree with the interpretation    Assessment & Plan     Assessment & Plan  1. Perforated appendicitis.  The patient's condition is stable, with no significant pain reported. The drain is effectively managing the abscess, and there is evidence of inflammation subsiding. A CT scan revealed a potential fistula or sinus tract connecting the appendix to the abscess. The plan is to keep the drain in place until surgery to prevent further complications. A laparoscopic  appendectomy is scheduled for 01/02/2025. The procedure will involve making small incisions to remove the appendix and possibly placing another drain if necessary. The risks of the surgery, including bleeding, infection, and the possibility of converting to an open procedure, were discussed. He was advised to discontinue amoxicillin after the current 10-day course unless symptoms such as fever, worsening abdominal pain, or severe diarrhea develop, which could indicate a worsening infection. Post-surgery, he should avoid heavy lifting for approximately 4 weeks and can resume light jogging 2 weeks after the procedure. He was advised to take at least one week off work post-surgery and to monitor bowel movements for any changes. If bowel movements become more liquid, he should inform the provider. If a fever exceeding 101.5 degrees occurs, he should contact the provider immediately.    PROCEDURE  The patient underwent a drain repositioning procedure on Monday, performed by Dr. Daniel.       Active Problems:    * No active hospital problems. *      I discussed the patients findings and my recommendations with patient.     Singh Martinez MD  12/11/24  09:03 EST    Patient or patient representative verbalized consent for the use of Ambient Listening during the visit with  Singh Martinez MD for chart documentation. 12/11/2024  09:26 EST

## 2024-12-20 ENCOUNTER — LAB (OUTPATIENT)
Dept: LAB | Facility: HOSPITAL | Age: 44
End: 2024-12-20
Payer: OTHER GOVERNMENT

## 2024-12-20 ENCOUNTER — HOSPITAL ENCOUNTER (OUTPATIENT)
Dept: GENERAL RADIOLOGY | Facility: HOSPITAL | Age: 44
Discharge: HOME | End: 2024-12-20
Payer: OTHER GOVERNMENT

## 2024-12-20 ENCOUNTER — HOSPITAL ENCOUNTER (OUTPATIENT)
Dept: CARDIOLOGY | Facility: HOSPITAL | Age: 44
Discharge: HOME | End: 2024-12-20
Payer: OTHER GOVERNMENT

## 2024-12-20 DIAGNOSIS — K35.32 APPENDICITIS WITH PERFORATION: ICD-10-CM

## 2024-12-20 LAB
ALBUMIN SERPL-MCNC: 4.5 G/DL (ref 3.5–5.2)
ALBUMIN/GLOB SERPL: 1.3 G/DL
ALP SERPL-CCNC: 86 U/L (ref 39–117)
ALT SERPL W P-5'-P-CCNC: 28 U/L (ref 1–41)
ANION GAP SERPL CALCULATED.3IONS-SCNC: 13.8 MMOL/L (ref 5–15)
AST SERPL-CCNC: 35 U/L (ref 1–40)
BASOPHILS # BLD AUTO: 0.07 10*3/MM3 (ref 0–0.2)
BASOPHILS NFR BLD AUTO: 1.8 % (ref 0–1.5)
BILIRUB SERPL-MCNC: 0.4 MG/DL (ref 0–1.2)
BUN SERPL-MCNC: 5 MG/DL (ref 6–20)
BUN/CREAT SERPL: 7 (ref 7–25)
CALCIUM SPEC-SCNC: 9.5 MG/DL (ref 8.6–10.5)
CHLORIDE SERPL-SCNC: 102 MMOL/L (ref 98–107)
CO2 SERPL-SCNC: 27.2 MMOL/L (ref 22–29)
CREAT SERPL-MCNC: 0.71 MG/DL (ref 0.76–1.27)
DEPRECATED RDW RBC AUTO: 45.4 FL (ref 37–54)
EGFRCR SERPLBLD CKD-EPI 2021: 115.3 ML/MIN/1.73
EOSINOPHIL # BLD AUTO: 0.21 10*3/MM3 (ref 0–0.4)
EOSINOPHIL NFR BLD AUTO: 5.3 % (ref 0.3–6.2)
ERYTHROCYTE [DISTWIDTH] IN BLOOD BY AUTOMATED COUNT: 13 % (ref 12.3–15.4)
GLOBULIN UR ELPH-MCNC: 3.6 GM/DL
GLUCOSE SERPL-MCNC: 120 MG/DL (ref 65–99)
HBA1C MFR BLD: 6.42 % (ref 4.8–5.6)
HCT VFR BLD AUTO: 40.1 % (ref 37.5–51)
HGB BLD-MCNC: 13.2 G/DL (ref 13–17.7)
IMM GRANULOCYTES # BLD AUTO: 0.01 10*3/MM3 (ref 0–0.05)
IMM GRANULOCYTES NFR BLD AUTO: 0.3 % (ref 0–0.5)
LYMPHOCYTES # BLD AUTO: 1.75 10*3/MM3 (ref 0.7–3.1)
LYMPHOCYTES NFR BLD AUTO: 44.1 % (ref 19.6–45.3)
MCH RBC QN AUTO: 31.5 PG (ref 26.6–33)
MCHC RBC AUTO-ENTMCNC: 32.9 G/DL (ref 31.5–35.7)
MCV RBC AUTO: 95.7 FL (ref 79–97)
MONOCYTES # BLD AUTO: 0.32 10*3/MM3 (ref 0.1–0.9)
MONOCYTES NFR BLD AUTO: 8.1 % (ref 5–12)
NEUTROPHILS NFR BLD AUTO: 1.61 10*3/MM3 (ref 1.7–7)
NEUTROPHILS NFR BLD AUTO: 40.4 % (ref 42.7–76)
NRBC BLD AUTO-RTO: 0 /100 WBC (ref 0–0.2)
PLATELET # BLD AUTO: 293 10*3/MM3 (ref 140–450)
PMV BLD AUTO: 9.9 FL (ref 6–12)
POTASSIUM SERPL-SCNC: 3.7 MMOL/L (ref 3.5–5.2)
PROT SERPL-MCNC: 8.1 G/DL (ref 6–8.5)
RBC # BLD AUTO: 4.19 10*6/MM3 (ref 4.14–5.8)
SODIUM SERPL-SCNC: 143 MMOL/L (ref 136–145)
WBC NRBC COR # BLD AUTO: 3.97 10*3/MM3 (ref 3.4–10.8)

## 2024-12-20 PROCEDURE — 83036 HEMOGLOBIN GLYCOSYLATED A1C: CPT

## 2024-12-20 PROCEDURE — 93005 ELECTROCARDIOGRAM TRACING: CPT | Performed by: SURGERY

## 2024-12-20 PROCEDURE — 36415 COLL VENOUS BLD VENIPUNCTURE: CPT

## 2024-12-20 PROCEDURE — 80053 COMPREHEN METABOLIC PANEL: CPT

## 2024-12-20 PROCEDURE — 71046 X-RAY EXAM CHEST 2 VIEWS: CPT

## 2024-12-20 PROCEDURE — 85025 COMPLETE CBC W/AUTO DIFF WBC: CPT

## 2024-12-21 LAB
QT INTERVAL: 373 MS
QTC INTERVAL: 408 MS

## 2024-12-31 ENCOUNTER — ANESTHESIA EVENT (OUTPATIENT)
Dept: PERIOP | Facility: HOSPITAL | Age: 44
End: 2024-12-31
Payer: OTHER GOVERNMENT

## 2024-12-31 NOTE — ANESTHESIA PREPROCEDURE EVALUATION
Anesthesia Evaluation     Patient summary reviewed and Nursing notes reviewed   no history of anesthetic complications:   NPO Solid Status: > 8 hours  NPO Liquid Status: > 2 hours           Airway   Dental      Pulmonary    (+) a smoker Former,  Cardiovascular     ECG reviewed        Neuro/Psych  GI/Hepatic/Renal/Endo    (+) obesity    Musculoskeletal     Abdominal    Substance History      OB/GYN          Other        ROS/Med Hx Other: Additional History:  Perforated appendicitis    PSH:                Anesthesia Plan    ASA 2     general     (Patient identified; pre-operative vital signs, all relevant labs/studies, complete medical/surgical/anesthetic history, full medication list, full allergy list, and NPO status obtained/reviewed; physical assessment performed; anesthetic options, side effects, potential complications, risks, and benefits discussed; questions answered; written anesthesia consent obtained; patient cleared for procedure; anesthesia machine and equipment checked and functioning)  intravenous induction     Anesthetic plan, risks, benefits, and alternatives have been provided, discussed and informed consent has been obtained with: patient.    Plan discussed with CRNA and CAA.    CODE STATUS:

## 2025-01-02 ENCOUNTER — HOSPITAL ENCOUNTER (OUTPATIENT)
Facility: HOSPITAL | Age: 45
Setting detail: HOSPITAL OUTPATIENT SURGERY
Discharge: HOME | End: 2025-01-02
Attending: SURGERY | Admitting: SURGERY
Payer: OTHER GOVERNMENT

## 2025-01-02 ENCOUNTER — ANESTHESIA (OUTPATIENT)
Dept: PERIOP | Facility: HOSPITAL | Age: 45
End: 2025-01-02
Payer: OTHER GOVERNMENT

## 2025-01-02 VITALS
WEIGHT: 272.8 LBS | TEMPERATURE: 98.1 F | DIASTOLIC BLOOD PRESSURE: 70 MMHG | HEART RATE: 70 BPM | HEIGHT: 78 IN | SYSTOLIC BLOOD PRESSURE: 115 MMHG | OXYGEN SATURATION: 95 % | BODY MASS INDEX: 31.56 KG/M2 | RESPIRATION RATE: 20 BRPM

## 2025-01-02 DIAGNOSIS — K35.32 APPENDICITIS WITH PERFORATION: ICD-10-CM

## 2025-01-02 PROBLEM — K37 APPENDICITIS: Status: RESOLVED | Noted: 2024-11-14 | Resolved: 2025-01-02

## 2025-01-02 PROCEDURE — 25010000002 CEFOXITIN PER 1 G: Performed by: SURGERY

## 2025-01-02 PROCEDURE — 25010000002 DEXAMETHASONE PER 1 MG: Performed by: ANESTHESIOLOGIST ASSISTANT

## 2025-01-02 PROCEDURE — 25810000003 LACTATED RINGERS PER 1000 ML: Performed by: ANESTHESIOLOGIST ASSISTANT

## 2025-01-02 PROCEDURE — 88341 IMHCHEM/IMCYTCHM EA ADD ANTB: CPT | Performed by: SURGERY

## 2025-01-02 PROCEDURE — 25010000002 MIDAZOLAM PER 1 MG: Performed by: ANESTHESIOLOGIST ASSISTANT

## 2025-01-02 PROCEDURE — 44970 LAPAROSCOPY APPENDECTOMY: CPT | Performed by: NURSE PRACTITIONER

## 2025-01-02 PROCEDURE — 25010000002 PROPOFOL 10 MG/ML EMULSION: Performed by: ANESTHESIOLOGIST ASSISTANT

## 2025-01-02 PROCEDURE — 88304 TISSUE EXAM BY PATHOLOGIST: CPT | Performed by: SURGERY

## 2025-01-02 PROCEDURE — 88342 IMHCHEM/IMCYTCHM 1ST ANTB: CPT | Performed by: SURGERY

## 2025-01-02 PROCEDURE — 25010000002 LIDOCAINE PF 1% 1 % SOLUTION: Performed by: ANESTHESIOLOGIST ASSISTANT

## 2025-01-02 PROCEDURE — 25010000002 ONDANSETRON PER 1 MG: Performed by: ANESTHESIOLOGIST ASSISTANT

## 2025-01-02 PROCEDURE — 25010000002 BUPIVACAINE (PF) 0.25 % SOLUTION: Performed by: SURGERY

## 2025-01-02 PROCEDURE — 44970 LAPAROSCOPY APPENDECTOMY: CPT | Performed by: SURGERY

## 2025-01-02 PROCEDURE — 25010000002 HYDROMORPHONE 1 MG/ML SOLUTION: Performed by: ANESTHESIOLOGIST ASSISTANT

## 2025-01-02 PROCEDURE — 25810000003 LACTATED RINGERS PER 1000 ML: Performed by: ANESTHESIOLOGY

## 2025-01-02 PROCEDURE — 25010000002 SUGAMMADEX 200 MG/2ML SOLUTION: Performed by: ANESTHESIOLOGIST ASSISTANT

## 2025-01-02 PROCEDURE — 25010000002 FENTANYL CITRATE (PF) 100 MCG/2ML SOLUTION: Performed by: ANESTHESIOLOGIST ASSISTANT

## 2025-01-02 DEVICE — ENDOPATH ETS45 2.5MM RELOADS (VASCULAR/THIN)
Type: IMPLANTABLE DEVICE | Site: ABDOMEN | Status: FUNCTIONAL
Brand: ENDOPATH

## 2025-01-02 RX ORDER — DIPHENHYDRAMINE HYDROCHLORIDE 50 MG/ML
12.5 INJECTION INTRAMUSCULAR; INTRAVENOUS
Status: DISCONTINUED | OUTPATIENT
Start: 2025-01-02 | End: 2025-01-02 | Stop reason: HOSPADM

## 2025-01-02 RX ORDER — EPHEDRINE SULFATE 5 MG/ML
5 INJECTION INTRAVENOUS ONCE AS NEEDED
Status: DISCONTINUED | OUTPATIENT
Start: 2025-01-02 | End: 2025-01-02 | Stop reason: HOSPADM

## 2025-01-02 RX ORDER — SODIUM CHLORIDE 0.9 % (FLUSH) 0.9 %
10 SYRINGE (ML) INJECTION EVERY 12 HOURS SCHEDULED
Status: DISCONTINUED | OUTPATIENT
Start: 2025-01-02 | End: 2025-01-02 | Stop reason: HOSPADM

## 2025-01-02 RX ORDER — ACETAMINOPHEN 325 MG/1
650 TABLET ORAL ONCE AS NEEDED
Status: DISCONTINUED | OUTPATIENT
Start: 2025-01-02 | End: 2025-01-02 | Stop reason: HOSPADM

## 2025-01-02 RX ORDER — DEXAMETHASONE SODIUM PHOSPHATE 4 MG/ML
INJECTION, SOLUTION INTRA-ARTICULAR; INTRALESIONAL; INTRAMUSCULAR; INTRAVENOUS; SOFT TISSUE AS NEEDED
Status: DISCONTINUED | OUTPATIENT
Start: 2025-01-02 | End: 2025-01-02 | Stop reason: SURG

## 2025-01-02 RX ORDER — DIPHENHYDRAMINE HCL 25 MG
25 CAPSULE ORAL
Status: DISCONTINUED | OUTPATIENT
Start: 2025-01-02 | End: 2025-01-02 | Stop reason: HOSPADM

## 2025-01-02 RX ORDER — PROMETHAZINE HYDROCHLORIDE 25 MG/1
25 SUPPOSITORY RECTAL ONCE AS NEEDED
Status: DISCONTINUED | OUTPATIENT
Start: 2025-01-02 | End: 2025-01-02 | Stop reason: HOSPADM

## 2025-01-02 RX ORDER — PROCHLORPERAZINE EDISYLATE 5 MG/ML
10 INJECTION INTRAMUSCULAR; INTRAVENOUS ONCE AS NEEDED
Status: DISCONTINUED | OUTPATIENT
Start: 2025-01-02 | End: 2025-01-02 | Stop reason: HOSPADM

## 2025-01-02 RX ORDER — DEXAMETHASONE SODIUM PHOSPHATE 4 MG/ML
8 INJECTION, SOLUTION INTRA-ARTICULAR; INTRALESIONAL; INTRAMUSCULAR; INTRAVENOUS; SOFT TISSUE ONCE AS NEEDED
Status: DISCONTINUED | OUTPATIENT
Start: 2025-01-02 | End: 2025-01-02 | Stop reason: HOSPADM

## 2025-01-02 RX ORDER — MIDAZOLAM HYDROCHLORIDE 1 MG/ML
INJECTION, SOLUTION INTRAMUSCULAR; INTRAVENOUS AS NEEDED
Status: DISCONTINUED | OUTPATIENT
Start: 2025-01-02 | End: 2025-01-02 | Stop reason: SURG

## 2025-01-02 RX ORDER — DIPHENHYDRAMINE HYDROCHLORIDE 50 MG/ML
12.5 INJECTION INTRAMUSCULAR; INTRAVENOUS ONCE AS NEEDED
Status: DISCONTINUED | OUTPATIENT
Start: 2025-01-02 | End: 2025-01-02 | Stop reason: HOSPADM

## 2025-01-02 RX ORDER — NALOXONE HCL 0.4 MG/ML
0.4 VIAL (ML) INJECTION AS NEEDED
Status: DISCONTINUED | OUTPATIENT
Start: 2025-01-02 | End: 2025-01-02 | Stop reason: HOSPADM

## 2025-01-02 RX ORDER — FENTANYL CITRATE 50 UG/ML
100 INJECTION, SOLUTION INTRAMUSCULAR; INTRAVENOUS
Status: DISCONTINUED | OUTPATIENT
Start: 2025-01-02 | End: 2025-01-02 | Stop reason: HOSPADM

## 2025-01-02 RX ORDER — ONDANSETRON 2 MG/ML
INJECTION INTRAMUSCULAR; INTRAVENOUS AS NEEDED
Status: DISCONTINUED | OUTPATIENT
Start: 2025-01-02 | End: 2025-01-02 | Stop reason: SURG

## 2025-01-02 RX ORDER — ROCURONIUM BROMIDE 10 MG/ML
INJECTION, SOLUTION INTRAVENOUS AS NEEDED
Status: DISCONTINUED | OUTPATIENT
Start: 2025-01-02 | End: 2025-01-02 | Stop reason: SURG

## 2025-01-02 RX ORDER — PROPOFOL 10 MG/ML
VIAL (ML) INTRAVENOUS AS NEEDED
Status: DISCONTINUED | OUTPATIENT
Start: 2025-01-02 | End: 2025-01-02 | Stop reason: SURG

## 2025-01-02 RX ORDER — HYDROCODONE BITARTRATE AND ACETAMINOPHEN 5; 325 MG/1; MG/1
1 TABLET ORAL EVERY 4 HOURS PRN
Qty: 30 TABLET | Refills: 0 | Status: SHIPPED | OUTPATIENT
Start: 2025-01-02 | End: 2025-01-15

## 2025-01-02 RX ORDER — SODIUM CHLORIDE 0.9 % (FLUSH) 0.9 %
10 SYRINGE (ML) INJECTION AS NEEDED
Status: DISCONTINUED | OUTPATIENT
Start: 2025-01-02 | End: 2025-01-02 | Stop reason: HOSPADM

## 2025-01-02 RX ORDER — OXYCODONE HYDROCHLORIDE 5 MG/1
10 TABLET ORAL ONCE AS NEEDED
Status: COMPLETED | OUTPATIENT
Start: 2025-01-02 | End: 2025-01-02

## 2025-01-02 RX ORDER — SODIUM CHLORIDE, SODIUM LACTATE, POTASSIUM CHLORIDE, CALCIUM CHLORIDE 600; 310; 30; 20 MG/100ML; MG/100ML; MG/100ML; MG/100ML
9 INJECTION, SOLUTION INTRAVENOUS CONTINUOUS PRN
Status: DISCONTINUED | OUTPATIENT
Start: 2025-01-02 | End: 2025-01-02 | Stop reason: HOSPADM

## 2025-01-02 RX ORDER — MIDAZOLAM HYDROCHLORIDE 1 MG/ML
1 INJECTION, SOLUTION INTRAMUSCULAR; INTRAVENOUS
Status: DISCONTINUED | OUTPATIENT
Start: 2025-01-02 | End: 2025-01-02 | Stop reason: HOSPADM

## 2025-01-02 RX ORDER — MEPERIDINE HYDROCHLORIDE 25 MG/ML
12.5 INJECTION INTRAMUSCULAR; INTRAVENOUS; SUBCUTANEOUS
Status: DISCONTINUED | OUTPATIENT
Start: 2025-01-02 | End: 2025-01-02 | Stop reason: HOSPADM

## 2025-01-02 RX ORDER — FLUMAZENIL 0.1 MG/ML
0.5 INJECTION INTRAVENOUS AS NEEDED
Status: DISCONTINUED | OUTPATIENT
Start: 2025-01-02 | End: 2025-01-02 | Stop reason: HOSPADM

## 2025-01-02 RX ORDER — PROMETHAZINE HYDROCHLORIDE 25 MG/1
25 TABLET ORAL ONCE AS NEEDED
Status: DISCONTINUED | OUTPATIENT
Start: 2025-01-02 | End: 2025-01-02 | Stop reason: HOSPADM

## 2025-01-02 RX ORDER — SODIUM CHLORIDE 9 MG/ML
40 INJECTION, SOLUTION INTRAVENOUS AS NEEDED
Status: DISCONTINUED | OUTPATIENT
Start: 2025-01-02 | End: 2025-01-02 | Stop reason: HOSPADM

## 2025-01-02 RX ORDER — HYDRALAZINE HYDROCHLORIDE 20 MG/ML
5 INJECTION INTRAMUSCULAR; INTRAVENOUS
Status: DISCONTINUED | OUTPATIENT
Start: 2025-01-02 | End: 2025-01-02 | Stop reason: HOSPADM

## 2025-01-02 RX ORDER — LIDOCAINE HYDROCHLORIDE 10 MG/ML
INJECTION, SOLUTION EPIDURAL; INFILTRATION; INTRACAUDAL; PERINEURAL AS NEEDED
Status: DISCONTINUED | OUTPATIENT
Start: 2025-01-02 | End: 2025-01-02 | Stop reason: SURG

## 2025-01-02 RX ORDER — ONDANSETRON 2 MG/ML
4 INJECTION INTRAMUSCULAR; INTRAVENOUS ONCE AS NEEDED
Status: DISCONTINUED | OUTPATIENT
Start: 2025-01-02 | End: 2025-01-02 | Stop reason: HOSPADM

## 2025-01-02 RX ORDER — BUPIVACAINE HYDROCHLORIDE 2.5 MG/ML
INJECTION, SOLUTION EPIDURAL; INFILTRATION; INTRACAUDAL AS NEEDED
Status: DISCONTINUED | OUTPATIENT
Start: 2025-01-02 | End: 2025-01-02 | Stop reason: HOSPADM

## 2025-01-02 RX ORDER — METOCLOPRAMIDE HYDROCHLORIDE 5 MG/ML
10 INJECTION INTRAMUSCULAR; INTRAVENOUS ONCE AS NEEDED
Status: DISCONTINUED | OUTPATIENT
Start: 2025-01-02 | End: 2025-01-02 | Stop reason: HOSPADM

## 2025-01-02 RX ORDER — IPRATROPIUM BROMIDE AND ALBUTEROL SULFATE 2.5; .5 MG/3ML; MG/3ML
3 SOLUTION RESPIRATORY (INHALATION) ONCE AS NEEDED
Status: DISCONTINUED | OUTPATIENT
Start: 2025-01-02 | End: 2025-01-02 | Stop reason: HOSPADM

## 2025-01-02 RX ORDER — ACETAMINOPHEN 650 MG/1
650 SUPPOSITORY RECTAL EVERY 4 HOURS PRN
Status: DISCONTINUED | OUTPATIENT
Start: 2025-01-02 | End: 2025-01-02 | Stop reason: HOSPADM

## 2025-01-02 RX ORDER — FENTANYL CITRATE 50 UG/ML
INJECTION, SOLUTION INTRAMUSCULAR; INTRAVENOUS AS NEEDED
Status: DISCONTINUED | OUTPATIENT
Start: 2025-01-02 | End: 2025-01-02 | Stop reason: SURG

## 2025-01-02 RX ORDER — SODIUM CHLORIDE, SODIUM LACTATE, POTASSIUM CHLORIDE, CALCIUM CHLORIDE 600; 310; 30; 20 MG/100ML; MG/100ML; MG/100ML; MG/100ML
INJECTION, SOLUTION INTRAVENOUS CONTINUOUS PRN
Status: DISCONTINUED | OUTPATIENT
Start: 2025-01-02 | End: 2025-01-02 | Stop reason: SURG

## 2025-01-02 RX ORDER — CHLORHEXIDINE GLUCONATE ORAL RINSE 1.2 MG/ML
15 SOLUTION DENTAL EVERY 12 HOURS SCHEDULED
Status: DISCONTINUED | OUTPATIENT
Start: 2025-01-02 | End: 2025-01-02 | Stop reason: HOSPADM

## 2025-01-02 RX ORDER — IBUPROFEN 600 MG/1
600 TABLET, FILM COATED ORAL ONCE AS NEEDED
Status: DISCONTINUED | OUTPATIENT
Start: 2025-01-02 | End: 2025-01-02 | Stop reason: HOSPADM

## 2025-01-02 RX ORDER — LABETALOL HYDROCHLORIDE 5 MG/ML
5 INJECTION, SOLUTION INTRAVENOUS
Status: DISCONTINUED | OUTPATIENT
Start: 2025-01-02 | End: 2025-01-02 | Stop reason: HOSPADM

## 2025-01-02 RX ADMIN — FENTANYL CITRATE 100 MCG: 50 INJECTION, SOLUTION INTRAMUSCULAR; INTRAVENOUS at 10:26

## 2025-01-02 RX ADMIN — DEXAMETHASONE SODIUM PHOSPHATE 8 MG: 4 INJECTION, SOLUTION INTRAMUSCULAR; INTRAVENOUS at 10:31

## 2025-01-02 RX ADMIN — LIDOCAINE HYDROCHLORIDE 50 MG: 10 INJECTION, SOLUTION EPIDURAL; INFILTRATION; INTRACAUDAL; PERINEURAL at 10:27

## 2025-01-02 RX ADMIN — SUGAMMADEX 200 MG: 100 INJECTION, SOLUTION INTRAVENOUS at 11:00

## 2025-01-02 RX ADMIN — ROCURONIUM BROMIDE 50 MG: 10 INJECTION, SOLUTION INTRAVENOUS at 10:27

## 2025-01-02 RX ADMIN — ONDANSETRON 4 MG: 2 INJECTION, SOLUTION INTRAMUSCULAR; INTRAVENOUS at 11:00

## 2025-01-02 RX ADMIN — PROPOFOL 200 MG: 10 INJECTION, EMULSION INTRAVENOUS at 10:27

## 2025-01-02 RX ADMIN — HYDROMORPHONE HYDROCHLORIDE 1 MG: 1 INJECTION, SOLUTION INTRAMUSCULAR; INTRAVENOUS; SUBCUTANEOUS at 11:28

## 2025-01-02 RX ADMIN — SODIUM CHLORIDE, SODIUM LACTATE, POTASSIUM CHLORIDE, AND CALCIUM CHLORIDE: .6; .31; .03; .02 INJECTION, SOLUTION INTRAVENOUS at 10:24

## 2025-01-02 RX ADMIN — CHLORHEXIDINE GLUCONATE 15 ML: 1.2 RINSE ORAL at 10:07

## 2025-01-02 RX ADMIN — CEFOXITIN SODIUM 2 G: 2 POWDER, FOR SOLUTION INTRAVENOUS at 10:18

## 2025-01-02 RX ADMIN — MUPIROCIN 1 APPLICATION: 20 OINTMENT TOPICAL at 10:07

## 2025-01-02 RX ADMIN — MIDAZOLAM 2 MG: 1 INJECTION INTRAMUSCULAR; INTRAVENOUS at 10:26

## 2025-01-02 RX ADMIN — SODIUM CHLORIDE, SODIUM LACTATE, POTASSIUM CHLORIDE, CALCIUM CHLORIDE 9 ML/HR: 20; 30; 600; 310 INJECTION, SOLUTION INTRAVENOUS at 10:07

## 2025-01-02 RX ADMIN — OXYCODONE 10 MG: 5 TABLET ORAL at 11:49

## 2025-01-02 NOTE — DISCHARGE INSTRUCTIONS
Ok for discharge  Follow-up with me (Dr. Martinez) in 2 weeks  Regular diet as tolerated  Ok to shower starting tomorrow. No tub baths, pools, lakes, or streams for 1 week.  Ok to apply ice to incisions  No heavy lifting (greater than 20 lbs) for 6 weeks after surgery  Call my office or present to the ED with: fevers greater than 101.5, redness around the incisions, drainage from the incisions, intractable nausea/vomiting, or pain that is getting worse instead of better

## 2025-01-02 NOTE — ANESTHESIA PROCEDURE NOTES
Airway  Date/Time: 1/2/2025 10:29 AM    Indications and Patient Condition  Indications for airway management: airway protection    Preoxygenated: yes  MILS maintained throughout  Mask difficulty assessment: 0 - not attempted    Final Airway Details  Final airway type: endotracheal airway      Successful airway: ETT     Successful intubation technique: video laryngoscopy  Facilitating devices/methods: intubating stylet  Endotracheal tube insertion site: oral  Blade: Wang  ETT size (mm): 7.5  Cormack-Lehane Classification: grade I - full view of glottis  Measured from: teeth  ETT/EBT  to teeth (cm): 22  Number of attempts at approach: 1  Assessment: lips, teeth, and gum same as pre-op and atraumatic intubation

## 2025-01-02 NOTE — ANESTHESIA POSTPROCEDURE EVALUATION
Patient: Alem Arroyo    Procedure Summary       Date: 01/02/25 Room / Location: [facility] OR 06 / [facility] MA[facility] OR    Anesthesia Start: 1024 Anesthesia Stop: 1118    Procedure: APPENDECTOMY LAPAROSCOPIC (Abdomen) Diagnosis:       Appendicitis with perforation      (Appendicitis with perforation [K35.32])    Surgeons: Singh Martinez MD Provider: Michael Bustos MD    Anesthesia Type: general ASA Status: 2            Anesthesia Type: general    Vitals  Vitals Value Taken Time   /73 01/02/25 1138   Temp 97.7 °F (36.5 °C) 01/02/25 1121   Pulse 68 01/02/25 1138   Resp 23 01/02/25 1135   SpO2 96 % 01/02/25 1138   Vitals shown include unfiled device data.        Post Anesthesia Care and Evaluation    Patient location during evaluation: PACU  Patient participation: complete - patient participated  Level of consciousness: awake  Pain scale: See nurse's notes for pain score.  Pain management: adequate    Airway patency: patent  Anesthetic complications: No anesthetic complications  PONV Status: none  Cardiovascular status: acceptable  Respiratory status: acceptable and spontaneous ventilation  Hydration status: acceptable    Comments: Patient seen and examined postoperatively; vital signs stable; SpO2 greater than or equal to 90%; cardiopulmonary status stable; nausea/vomiting adequately controlled; pain adequately controlled; no apparent anesthesia complications; patient discharged from anesthesia care when discharge criteria were met

## 2025-01-02 NOTE — OP NOTE
APPENDECTOMY LAPAROSCOPIC  Operative Note    Patient Name:  Alem Arroyo  YOB: 1980    Date of Surgery:  1/2/2025     Indications: Patient is a 44-year-old gentleman who had perforated appendicitis approximately 8 weeks ago and was treated with IV antibiotics and percutaneous drainage.  We discussed the risk, benefits, and alternatives to surgery, the patient understands, and agrees to proceed to the operating room for interval laparoscopic appendectomy.    Pre-op Diagnosis:   Appendicitis with perforation [K35.32]    Post-op Diagnosis:  Post-Op Diagnosis Codes:     * Appendicitis with perforation [K35.32]    Procedure/CPT® Codes:      Procedure(s):  APPENDECTOMY LAPAROSCOPIC    Staff:  Surgeon(s):  Singh Martinez MD    Assistant: Abbey Moreland APRN was responsible for performing the following activities: Closing and handheld positioning of the camera  and their skilled assistance was necessary for the success of this case.     Anesthesia: General    Estimated Blood Loss: minimal    Implants:    Implant Name Type Inv. Item Serial No.  Lot No. LRB No. Used Action   RELOAD STPLR ENDOPATH/ETS LNR/CUT THN 45MM WHT - [id number] Implant RELOAD STPLR ENDOPATH/ETS LNR/CUT THN 45MM WHT  [employer]  DIV [employer] J [employer] J [id number] N/A 1 Implanted       Specimen:          Specimens       ID Source Type Tests Collected By Collected At Frozen?    A Large Intestine, Appendix Tissue TISSUE PATHOLOGY EXAM   Singh Martinez MD 1/2/25 1101 No    This specimen was not marked as sent.            Findings: Flimsy adhesions in the right lower quadrant with continued inflammation of the loops of small bowel in the pelvis on the right side.  Thickened appendix without obvious signs of ongoing perforation or significant inflammation    Complications: none, immediately    Description of Procedure:   After obtaining informed consent in the pre-operative holding area, the patient was brought to  the operating room and placed in the supine position. SCDs were applied and pre-operative antibiotics were administered. The patient then underwent uncomplicated induction of general endotracheal anesthesia. The abdomen was then prepped and draped in the usual sterile fashion.  After a brief timeout, procedure began.  We began by making an infraumbilical incision after infiltrating local anesthesia and grasping the umbilical stalk to elevate the fascia.  The fascia was then incised, and the abdomen was entered bluntly.  Initial sweep of the abdomen revealed no bowel in the vicinity of injury.  We then placed anchoring 0 Vicryl sutures on either side of the fascia, inserted a Deras trocar, and raised pneumoperitoneum to 15 mmHg.  Additional working trochars were placed in the left lower quadrant and suprapubic areas.  The appendix was noted to be in the right lower quadrant and was grasped using a grasper.  The appendix appeared dilated, and erythematous.  A window was then created in the mesoappendix using blunt dissection.  We then fired a 45 mm vascular load stapler across the base of the appendix, making sure not to encroach on the ileocecal valve.  A LigaSure device was used to divide the mesoappendix and help take down some of the thicker areas of scar tissue in the right lower quadrant. With the appendix now completely removed, it was placed in Endo Catch bag and removed through the umbilical port.  We then reexamined the staple line of the appendix, and hemostasis was verified.  There was also no evidence of leak.  The auxillary ports were removed under direct visualization.  Pneumoperitoneum was released, and the umbilical fascia was closed using the previously placed 0 Vicryl suture in a modified U stitch pattern.  The skin was closed using 4-0 Vicryl.  The skin was dressed using skin affix skin glue.  The patient was extubated and taken to PACU in satisfactory condition.    Singh Martinez MD      Date: 1/2/2025  Time: 11:17 EST

## 2025-01-03 ENCOUNTER — TELEPHONE (OUTPATIENT)
Dept: SURGERY | Facility: CLINIC | Age: 45
End: 2025-01-03
Payer: OTHER GOVERNMENT

## 2025-01-03 NOTE — TELEPHONE ENCOUNTER
PO FU Call- spoke with pt. jose 2 wk po appt. Will fu then. Knows to call with any questions or concerns.      States doing well.

## 2025-01-09 LAB
LAB AP CASE REPORT: NORMAL
LAB AP DIAGNOSIS COMMENT: NORMAL
LAB AP SPECIAL STAINS: NORMAL
LAB AP SYNOPTIC CHECKLIST: NORMAL
PATH REPORT.FINAL DX SPEC: NORMAL
PATH REPORT.GROSS SPEC: NORMAL

## 2025-01-15 ENCOUNTER — OFFICE VISIT (OUTPATIENT)
Dept: SURGERY | Facility: CLINIC | Age: 45
End: 2025-01-15
Payer: OTHER GOVERNMENT

## 2025-01-15 VITALS
TEMPERATURE: 96.6 F | SYSTOLIC BLOOD PRESSURE: 130 MMHG | WEIGHT: 272.2 LBS | OXYGEN SATURATION: 96 % | HEART RATE: 63 BPM | BODY MASS INDEX: 31.49 KG/M2 | HEIGHT: 78 IN | DIASTOLIC BLOOD PRESSURE: 96 MMHG | RESPIRATION RATE: 18 BRPM

## 2025-01-15 DIAGNOSIS — D3A.020 CARCINOID TUMOR OF APPENDIX, UNSPECIFIED WHETHER MALIGNANT: ICD-10-CM

## 2025-01-15 DIAGNOSIS — K35.32 APPENDICITIS WITH PERFORATION: Primary | ICD-10-CM

## 2025-01-15 PROCEDURE — 99024 POSTOP FOLLOW-UP VISIT: CPT | Performed by: SURGERY

## 2025-01-15 NOTE — PROGRESS NOTES
"Post-op Note    Subjective   Alem Arroyo is a 44 y.o. male status post interval laparoscopic appendectomy performed on 1/2/2025 for perforated acute appendicitis.  Postoperatively, the patient reports that he is doing well.  He denies having any fevers, or right lower quadrant abdominal pain.  He does still have some pain around his incisions as well as some swelling and bruising.  He reports that he continues to have alternating constipation and diarrhea.  He does drink kombucha for probiotic properties, but has not been taking any fiber supplements.      Objective   /96 (BP Location: Right arm, Patient Position: Sitting, Cuff Size: Adult)   Pulse 63   Temp 96.6 °F (35.9 °C) (Infrared)   Resp 18   Ht 200.7 cm (79\")   Wt 123 kg (272 lb 3.2 oz)   SpO2 96%   BMI 30.66 kg/m²   Incisions appear to be well-healed without any surrounding erythema, duration, or drainage to suggest infection.  No evidence of hernia at the umbilicus      Assessment & Plan   Patient is a 44-year-old gentleman who underwent an interval appendectomy following acute perforated appendicitis on 1/2/2025.  He appears to be doing well.    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  We did discuss that no further surgical intervention is needed for this, however out of an abundance of caution I do think it would be a good idea for the patient to be evaluated by medical oncology due to the fact that his initial presentation was perforation.  I think it would be a good idea to establish care for them and discuss whether any further observation or workup is needed  No lifting more than 20 pounds for 4 weeks " after surgery  Recommend continuing probiotics and recommend adding fiber supplement to help bulk stool  Follow-up with me as needed    Singh Martinez MD  1/15/2025  09:03 EST

## 2025-03-25 ENCOUNTER — TELEPHONE (OUTPATIENT)
Dept: SURGERY | Facility: CLINIC | Age: 45
End: 2025-03-25
Payer: OTHER GOVERNMENT

## 2025-03-25 NOTE — TELEPHONE ENCOUNTER
Pt left message with on call regarding question about inc. Tried to call pt but Left message on machine to call back.

## 2025-03-26 ENCOUNTER — OFFICE VISIT (OUTPATIENT)
Dept: SURGERY | Facility: CLINIC | Age: 45
End: 2025-03-26
Payer: OTHER GOVERNMENT

## 2025-03-26 VITALS
RESPIRATION RATE: 18 BRPM | HEART RATE: 66 BPM | WEIGHT: 282.8 LBS | DIASTOLIC BLOOD PRESSURE: 106 MMHG | OXYGEN SATURATION: 99 % | TEMPERATURE: 97.7 F | BODY MASS INDEX: 32.72 KG/M2 | HEIGHT: 78 IN | SYSTOLIC BLOOD PRESSURE: 145 MMHG

## 2025-03-26 DIAGNOSIS — L98.8 DRAINING CUTANEOUS SINUS TRACT: Primary | ICD-10-CM

## 2025-03-26 PROCEDURE — 99024 POSTOP FOLLOW-UP VISIT: CPT | Performed by: SURGERY

## 2025-03-26 RX ORDER — SULFAMETHOXAZOLE AND TRIMETHOPRIM 800; 160 MG/1; MG/1
1 TABLET ORAL 2 TIMES DAILY
Qty: 10 TABLET | Refills: 0 | Status: SHIPPED | OUTPATIENT
Start: 2025-03-26

## 2025-03-26 NOTE — PROGRESS NOTES
"Post-op Note    Subjective   Alem Arroyo is a 44 y.o. male status post interval laparoscopic appendectomy performed on 1/2/2025 for perforated acute appendicitis.  Postoperatively, the patient reports that he was doing well until about a week ago at which point he developed a knot in the area where his drain was located.  He reports that it progressively got larger and more painful over the last week until last evening when it popped and drained a significant amount of clear bloody fluid.  The drainage has since slowed down significantly, and he has not had any further pain in this area.  He denies having any fevers.    Objective   BP (!) 145/106 (BP Location: Right arm, Patient Position: Sitting, Cuff Size: Large Adult)   Pulse 66   Temp 97.7 °F (36.5 °C) (Infrared)   Resp 18   Ht 200.7 cm (79\")   Wt 128 kg (282 lb 12.8 oz)   SpO2 99%   BMI 31.86 kg/m²   Drain site with discolored skin immediately surrounding this without any erythema, induration, or significant drainage today.      Assessment & Plan   Patient is a 44-year-old gentleman who underwent an interval appendectomy following acute perforated appendicitis on 1/2/2025.  He appears to be doing well.    Patient with a persistent sinus tract at the site of his drain.  Discussed that this may represent a fistula if the tract has epithelialized.  Recommend treating this with oral Bactrim and warm soaks in the tub.  Suspect that this will improve on its own.  If he develops recurrence, recommend return to the office at which point we may need to perform an I&D and debridement of the sinus tract    Singh Martinez MD  3/26/2025  12:51 EDT  "

## 2025-04-22 ENCOUNTER — OFFICE VISIT (OUTPATIENT)
Dept: SURGERY | Facility: CLINIC | Age: 45
End: 2025-04-22
Payer: OTHER GOVERNMENT

## 2025-04-22 VITALS
HEIGHT: 78 IN | SYSTOLIC BLOOD PRESSURE: 143 MMHG | RESPIRATION RATE: 18 BRPM | TEMPERATURE: 98.2 F | HEART RATE: 73 BPM | OXYGEN SATURATION: 97 % | BODY MASS INDEX: 32.12 KG/M2 | DIASTOLIC BLOOD PRESSURE: 102 MMHG | WEIGHT: 277.6 LBS

## 2025-04-22 DIAGNOSIS — L98.8 DRAINING CUTANEOUS SINUS TRACT: Primary | ICD-10-CM

## 2025-04-22 PROCEDURE — 99213 OFFICE O/P EST LOW 20 MIN: CPT | Performed by: SURGERY

## 2025-04-24 NOTE — PROGRESS NOTES
"Post-op Note    Subjective   Alem Arroyo is a 44 y.o. male status post interval laparoscopic appendectomy performed on 1/2/2025 for perforated acute appendicitis.  The patient has had persistent issues with a knot developing at the site of his drain.  He reports that about a week ago the area got larger roughly the size of a golf ball, more painful.  He was doing warm sitz bath's and it seemed to shrink the mass and improve his pain.  He has been off antibiotics since I last saw him. He denies having any fevers.    Objective   BP (!) 143/102 (BP Location: Right arm, Patient Position: Sitting, Cuff Size: Large Adult)   Pulse 73   Temp 98.2 °F (36.8 °C) (Infrared)   Resp 18   Ht 200.7 cm (79\")   Wt 126 kg (277 lb 9.6 oz)   SpO2 97%   BMI 31.27 kg/m²   Drain site with discolored skin immediately surrounding this without any erythema, induration, or significant drainage today.  The area was probed with a Q-tip and there was no evidence of any sinus or fistulous tract.  Small firm area of induration surrounding the drain site      Assessment & Plan   Patient is a 44-year-old gentleman who underwent an interval appendectomy following acute perforated appendicitis on 1/2/2025.  He appears to be doing well other than this area where his drain was located    Patient with a persistent sinus tract at the site of his drain.  Discussed that this may represent a fistula if the tract has epithelialized.  Recommend obtaining a CT scan of the pelvis to further characterize.  We will call him with the results  Recommend continuing warm soaks in the tub  I still suspect that this will improve on its own.  If this demonstrates a deeper abscess cavity or fistulous tract, then we will possibly proceed to the OR to core out his drain tract.    Singh Martinez MD  4/24/2025  10:29 EDT  "

## 2025-05-06 ENCOUNTER — TELEPHONE (OUTPATIENT)
Dept: SURGERY | Facility: CLINIC | Age: 45
End: 2025-05-06
Payer: OTHER GOVERNMENT

## 2025-05-15 ENCOUNTER — HOSPITAL ENCOUNTER (OUTPATIENT)
Dept: PET IMAGING | Facility: HOSPITAL | Age: 45
Discharge: HOME | End: 2025-05-15
Admitting: SURGERY
Payer: OTHER GOVERNMENT

## 2025-05-15 DIAGNOSIS — L98.8 DRAINING CUTANEOUS SINUS TRACT: ICD-10-CM

## 2025-05-15 PROCEDURE — 25510000001 IOPAMIDOL PER 1 ML: Performed by: SURGERY

## 2025-05-15 PROCEDURE — 72193 CT PELVIS W/DYE: CPT

## 2025-05-15 RX ORDER — IOPAMIDOL 755 MG/ML
100 INJECTION, SOLUTION INTRAVASCULAR
Status: COMPLETED | OUTPATIENT
Start: 2025-05-15 | End: 2025-05-15

## 2025-05-15 RX ADMIN — IOPAMIDOL 100 ML: 755 INJECTION, SOLUTION INTRAVENOUS at 12:49

## 2025-05-21 DIAGNOSIS — K65.1 ABSCESS, INTRA-ABDOMINAL, POSTOPERATIVE: Primary | ICD-10-CM

## 2025-05-21 DIAGNOSIS — T81.43XA ABSCESS, INTRA-ABDOMINAL, POSTOPERATIVE: Primary | ICD-10-CM

## 2025-05-21 RX ORDER — CEFDINIR 300 MG/1
300 CAPSULE ORAL 2 TIMES DAILY
Qty: 28 CAPSULE | Refills: 0 | Status: SHIPPED | OUTPATIENT
Start: 2025-05-21 | End: 2025-06-04

## 2025-05-21 RX ORDER — METRONIDAZOLE 500 MG/1
500 TABLET ORAL 3 TIMES DAILY
Qty: 42 TABLET | Refills: 0 | Status: SHIPPED | OUTPATIENT
Start: 2025-05-21 | End: 2025-06-04

## 2025-05-22 ENCOUNTER — TELEPHONE (OUTPATIENT)
Dept: SURGERY | Facility: CLINIC | Age: 45
End: 2025-05-22
Payer: OTHER GOVERNMENT

## 2025-05-22 DIAGNOSIS — T81.43XA ABSCESS, INTRA-ABDOMINAL, POSTOPERATIVE: Primary | ICD-10-CM

## 2025-05-22 DIAGNOSIS — K65.1 ABSCESS, INTRA-ABDOMINAL, POSTOPERATIVE: Primary | ICD-10-CM

## 2025-05-22 NOTE — TELEPHONE ENCOUNTER
Per Dr. Martinez regarding ct- He has what appears to be a small abscess in his pelvis near his rectum between his rectum and his small intestines. This is at the bottom of where his drain was going in. The drain site seems to be completely closed on the CT scan. I have written him 2 antibiotics (Omnicef and Flagyl) and I would like for him to take these for 2 weeks. After 2 weeks, I would obtain another CT scan to ensure resolution of this. If he does not have complete resolution, he may need to have a colonoscopy to ensure that he does not have a persistent hole to his sigmoid colon/rectal area. I will be happy to see him in my office and discuss this with him when I get back after he has completed his antibiotics and gotten the repeat CT.     Informed pt of this. Stated understood. Said he feels better and will get the abx picked up.     He is jose for his ct abd/pel on 6/9/25 at 1230 at [facility]. He will need to fu with dr. Martinez the following week due to him being on call.     Left message on machine to call back

## 2025-06-06 ENCOUNTER — TELEPHONE (OUTPATIENT)
Dept: SURGERY | Facility: CLINIC | Age: 45
End: 2025-06-06
Payer: OTHER GOVERNMENT

## 2025-06-06 NOTE — TELEPHONE ENCOUNTER
Per Christiano [facility] [phone] [facility]  25. New request form is needed to be sent for ct jose 25. Informed Mary Lou/Fátima of this. Will get sent in. Also left message with pt explaining issues.

## 2025-06-09 ENCOUNTER — TELEPHONE (OUTPATIENT)
Dept: SURGERY | Facility: CLINIC | Age: 45
End: 2025-06-09
Payer: OTHER GOVERNMENT

## 2025-06-09 NOTE — TELEPHONE ENCOUNTER
Had to raj ct to 6/13/25 at 2pm at the [facility]. Left message on machine to call back and mychart message. Needs to schedule an appt with Dr. Martinez next week to fu on testing. Also spoke with Kendall cardoza) at the [facility] that [facility] was [facility].

## 2025-06-13 ENCOUNTER — HOSPITAL ENCOUNTER (OUTPATIENT)
Dept: PET IMAGING | Facility: HOSPITAL | Age: 45
Discharge: HOME | End: 2025-06-13
Admitting: SURGERY
Payer: OTHER GOVERNMENT

## 2025-06-13 DIAGNOSIS — T81.43XA ABSCESS, INTRA-ABDOMINAL, POSTOPERATIVE: ICD-10-CM

## 2025-06-13 DIAGNOSIS — K65.1 ABSCESS, INTRA-ABDOMINAL, POSTOPERATIVE: ICD-10-CM

## 2025-06-13 PROCEDURE — 74177 CT ABD & PELVIS W/CONTRAST: CPT

## 2025-06-13 PROCEDURE — 25510000001 IOPAMIDOL PER 1 ML: Performed by: SURGERY

## 2025-06-13 RX ORDER — IOPAMIDOL 755 MG/ML
100 INJECTION, SOLUTION INTRAVASCULAR
Status: COMPLETED | OUTPATIENT
Start: 2025-06-13 | End: 2025-06-13

## 2025-06-13 RX ADMIN — IOPAMIDOL 100 ML: 755 INJECTION, SOLUTION INTRAVENOUS at 13:56

## 2025-06-24 ENCOUNTER — OFFICE VISIT (OUTPATIENT)
Dept: SURGERY | Facility: CLINIC | Age: 45
End: 2025-06-24
Payer: OTHER GOVERNMENT

## 2025-06-24 VITALS
SYSTOLIC BLOOD PRESSURE: 148 MMHG | RESPIRATION RATE: 18 BRPM | OXYGEN SATURATION: 98 % | BODY MASS INDEX: 29.63 KG/M2 | TEMPERATURE: 98.7 F | HEIGHT: 78 IN | DIASTOLIC BLOOD PRESSURE: 106 MMHG | WEIGHT: 256.1 LBS | HEART RATE: 88 BPM

## 2025-06-24 DIAGNOSIS — K63.0: ICD-10-CM

## 2025-06-24 DIAGNOSIS — K52.9: ICD-10-CM

## 2025-06-24 DIAGNOSIS — K50.014: ICD-10-CM

## 2025-06-24 DIAGNOSIS — D3A.020 CARCINOID TUMOR OF APPENDIX, UNSPECIFIED WHETHER MALIGNANT: Primary | ICD-10-CM

## 2025-06-24 PROCEDURE — 99213 OFFICE O/P EST LOW 20 MIN: CPT | Performed by: SURGERY

## 2025-06-24 NOTE — PROGRESS NOTES
GENERAL SURGERY ESTABLISHED PATIENT NOTE    Patient Care Team:  Kristin Alanis MD as PCP - General (Family Medicine)  Singh Martinez MD as Surgeon (General Surgery)    Reason for follow-up: Pelvic abscess    Subjective     Patient is a 44 y.o. male who was initially seen by me in late 2024 with the diagnosis of perforated acute appendicitis.  Initially he was treated with percutaneous drainage of the pelvic abscess via a transgluteal approach and IV antibiotics.  After 8 weeks, the patient had a interval laparoscopic appendectomy which was performed on 1/2/2025 for acute perforated appendicitis.  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  The patient was seen in follow-up and was doing well.  However after a few months he noticed some persistent swelling at the site of his drain site which was treated with antibiotics which seemed to improve slightly.  In May, the patient had a CT scan of the pelvis performed for persistent buttock abscess and was found to have a rim-enhancing gas/fluid collection in the pelvis measuring 3.2 x 1.5 x 2.6 cm.  There were few linear gas containing rim-enhancing tracts emanating towards the distal terminal ileum and sigmoid colon suspicious for possible fistulous connection and surgical follow-up was recommended.  His images were reviewed, and he was afebrile, hemodynamically stable, and without any real significant abdominal pain.  He was subsequently placed on 2 weeks of p.o. antibiotics and an additional CT scan of the abdomen and pelvis performed which demonstrated a Y shaped rim-enhancing gas and fluid collection in the pelvis which appeared smaller as well as tracks from the distal terminal ileum and the rectosigmoid colon region.  It was felt that these findings likely represent an old collapsed abscess cavity, however there is still some concern for fistulous connection although no evidence of any oral  contrast had entered into the abscess cavity.  He was noted to have mild colonic diverticulosis and hepatic steatosis.  Overall, he reports that he is feeling better after finishing his antibiotics.  His appetite, bowel movements, and activities have seem to have returned to normal.    History of Present Illness         Review of Systems   Constitutional:  Negative for chills and fever.   Gastrointestinal:  Negative for abdominal pain, nausea, rectal pain and vomiting.        History  No past medical history on file.  Past Surgical History:   Procedure Laterality Date    APPENDECTOMY N/A 1/2/2025    Procedure: APPENDECTOMY LAPAROSCOPIC;  Surgeon: Singh Martinez MD;  Location: [facility] MAIN OR;  Service: General;  Laterality: N/A;     Family History   Problem Relation Age of Onset    Cancer Mother     Depression Mother      Social History     Tobacco Use    Smoking status: Former     Types: Cigarettes     Start date: 1/1/2023     Passive exposure: Past    Smokeless tobacco: Never   Vaping Use    Vaping status: Never Used   Substance Use Topics    Alcohol use: Not Currently    Drug use: Never     (Not in a hospital admission)    Allergies:  Patient has no known allergies.    Objective     Vital Signs  Temp:  [98.7 °F (37.1 °C)] 98.7 °F (37.1 °C)  Heart Rate:  [88] 88  Resp:  [18] 18  BP: (148)/(106) 148/106    Physical Exam  Vitals reviewed.   Constitutional:       Appearance: He is well-developed.   HENT:      Head: Normocephalic and atraumatic.   Eyes:      Pupils: Pupils are equal, round, and reactive to light.   Cardiovascular:      Rate and Rhythm: Normal rate and regular rhythm.   Pulmonary:      Effort: Pulmonary effort is normal.      Breath sounds: Normal breath sounds.   Abdominal:      General: There is no distension.      Palpations: Abdomen is soft.      Tenderness: There is no abdominal tenderness.      Hernia: No hernia is present.   Musculoskeletal:         General: Normal range of motion.       Cervical back: Normal range of motion.   Lymphadenopathy:      Cervical: No cervical adenopathy.   Skin:     General: Skin is warm and dry.      Findings: No rash.   Neurological:      Mental Status: He is alert and oriented to person, place, and time.         Physical Exam         Results Review:   Lab Results (last 24 hours)       ** No results found for the last 24 hours. **          No radiology results for the last day    Results           I reviewed the patient's new imaging results and agree with the interpretation.  I reviewed the patient's other test results and agree with the interpretation    Assessment & Plan     Assessment & Plan    Intra-abdominal abscess  Terminal ileitis  Proctocolitis    CT scan images reviewed by me and the patient, and are concerning for the possibility of Crohn's disease given the amount of terminal ileitis and proctocolitis along with concern for fistulous connections to this collapsed abscess cavity.  CT scan from May also made mention of the potential of creeping fat in the terminal ileum and cecal area  My suspicion is that the patient likely had a massive Crohn's flare with abscess which was inaccurately diagnosed as perforated appendicitis.  Regardless, I did discuss with the patient that removal of his appendix seems to have been fortuitous as there was an incidental carcinoid tumor which measured 4 mm and for this no further workup or treatment is necessary.  I would like for the patient to follow-up with gastroenterology for a colonoscopy to look at the areas of proximal colitis and terminal ileitis and undergo a workup for inflammatory bowel disease, in particular Crohn's disease.  May follow-up with me after colonoscopy if findings are negative and if still having ongoing issues    I discussed the patients findings and my recommendations with the patient.     Singh Martinez MD  06/24/25  14:05 EDT

## (undated) DEVICE — SOLUTION,WATER,IRRIGATION,1000ML,STERILE: Brand: MEDLINE

## (undated) DEVICE — SYR LUERLOK 30CC

## (undated) DEVICE — MARYLAND JAW LAPAROSCOPIC SEALER/DIVIDER COATED: Brand: LIGASURE

## (undated) DEVICE — DRSNG WND BORDR/ADHS NONADHR/GZ LF 4X4IN STRL


## (undated) DEVICE — ANTIBACTERIAL UNDYED BRAIDED (POLYGLACTIN 910), SYNTHETIC ABSORBABLE SUTURE: Brand: COATED VICRYL

## (undated) DEVICE — UNDERGLV SURG BIOGEL/PI PF SYNTH SURG SZ8.5 BLU 50/BX

## (undated) DEVICE — INSUFFLATION TUBING SET, ENDOFLATOR 50: Brand: N.A.

## (undated) DEVICE — 1LYRTR 16FR10ML 100%SILI SNAP: Brand: MEDLINE INDUSTRIES, INC.

## (undated) DEVICE — ADHS SKIN PREMIERPRO EXOFIN TOPICAL HI/VISC .5ML

## (undated) DEVICE — THE STERILE LIGHT HANDLE COVER IS USED WITH STERIS SURGICAL LIGHTING AND VISUALIZATION SYSTEMS.

## (undated) DEVICE — PENCL HND ROCKRSWTCH HOLSTR EZ CLEAN TP CRD 10FT

## (undated) DEVICE — ENDOPATH XCEL WITH OPTIVIEW TECHNOLOGY BLADELESS TROCARS WITH STABILITY SLEEVES: Brand: ENDOPATH XCEL OPTIVIEW

## (undated) DEVICE — SOL IRR NACL 0.9PCT BO 1000ML

## (undated) DEVICE — SUT VIC 0 UR6 27IN VCP603H

## (undated) DEVICE — ENDOPATH XCEL WITH OPTIVIEW TECHNOLOGY UNIVERSAL TROCAR STABILITY SLEEVES: Brand: ENDOPATH XCEL OPTIVIEW

## (undated) DEVICE — THE STERILE CAMERA HANDLE COVER IS FOR USE WITH THE STERIS SURGICAL LIGHTING AND VISUALIZATION SYSTEMS.

## (undated) DEVICE — GLV SURG SIGNATURE ESSENTIAL PF LTX SZ8

## (undated) DEVICE — ENDOPATH ETS-FLEX45 ARTICULATING ENDOSCOPIC LINEAR CUTTER, NO RELOAD: Brand: ENDOPATH

## (undated) DEVICE — GOWN,REINFRCE,POLY,SIRUS,BREATH SLV,XXLG: Brand: MEDLINE

## (undated) DEVICE — KT SURG TURNOVER 050

## (undated) DEVICE — ENDOPATH XCEL BLUNT TIP TROCARS WITH SMOOTH SLEEVES: Brand: ENDOPATH XCEL

## (undated) DEVICE — GENERAL LAPAROSCOPY CDS: Brand: MEDLINE INDUSTRIES, INC.